# Patient Record
Sex: MALE | Race: WHITE | Employment: FULL TIME | URBAN - METROPOLITAN AREA
[De-identification: names, ages, dates, MRNs, and addresses within clinical notes are randomized per-mention and may not be internally consistent; named-entity substitution may affect disease eponyms.]

---

## 2021-08-02 ENCOUNTER — HOSPITAL ENCOUNTER (OUTPATIENT)
Dept: PHYSICAL THERAPY | Age: 51
Setting detail: THERAPIES SERIES
Discharge: HOME | End: 2021-08-02
Payer: COMMERCIAL

## 2021-08-02 PROCEDURE — 97162 PT EVAL MOD COMPLEX 30 MIN: CPT

## 2021-08-02 ASSESSMENT — PAIN DESCRIPTION - ORIENTATION: ORIENTATION: LEFT

## 2021-08-02 ASSESSMENT — PAIN DESCRIPTION - PAIN TYPE: TYPE: CHRONIC PAIN

## 2021-08-02 ASSESSMENT — PAIN DESCRIPTION - FREQUENCY: FREQUENCY: CONTINUOUS

## 2021-08-02 ASSESSMENT — PAIN DESCRIPTION - LOCATION: LOCATION: BACK;NECK

## 2021-08-02 ASSESSMENT — PAIN SCALES - GENERAL: PAINLEVEL_OUTOF10: 3

## 2021-08-02 NOTE — PLAN OF CARE
[address] [facility]   Radha Bain. [address], [place]  Phone:  [phone]   Fax:  [phone]    [] Certification  [] Recertification [x]  Plan of Care  [] Progress Note   [] Discharge        To:  Jt Yancey  From:  Beata Marti, MANDI  Patient: Arlyn Frias     : 1970  Diagnosis: Cervical radiculopathy     Date: 2021  Treatment Diagnosis: decreased cervical AROM, decreased UE strength, increases pain in neck and mid back and left side       Progress Report Period from: 21   to 2021                   OBJECTIVE:   Short Term Goals =Long term goals    Long Term Goals - Time Frame for Long term goals : 4 weeks  Goals Current/ Discharge status Met   Long term goal 1: Patient will report 50% improvement in left sided neck and upper back spasms. Patient reports spasms every night. [] yes  [x] no   Long term goal 2: Patient will increase cervical AROM with flexion, extension, and sidebending >/= 10 degrees for improved functional tolerance. AROM LUE (degrees)  LUE General AROM: shoulder WFLs  Spine  Cervical: flexion 40 deg, ext 25 deg, right SB 22 deg, left SB 30 deg, bilateral rotation 60 deg  Thoracic: thoracic rotation limited (reports tightness)  AROM RUE (degrees)  RUE General AROM: shoulder WFLs     [] yes  [x] no   Long term goal 3: Patient will increase strength in UEs and periscapulars >/= 1/2 manual muscle grade for improve lifting tolerance and work tolerance.        Strength RUE  R Shoulder Flexion: 4+/5  R Shoulder Extension: 4+/5  R Shoulder ABduction: 4+/5  R Elbow Flexion: 5/5  R Elbow Extension: 5/5  R Wrist Flexion: 4+/5  R Wrist Extension: 4+/5  Strength LUE  L Shoulder Flexion: 4+/5  L Shoulder Extension: 4+/5  L Shoulder ABduction: 4+/5  L Elbow Flexion: 5/5  L Elbow Extension: 5/5  L Wrist Flexion: 4+/5  L Wrist Extension: 4+/5  Strength Other  Other: decreased deep neck flexor endurance   [] yes  [x] no   Long term goal 4: NDI </= 16/50 to demonstrate functional improvements. NDI: 22/50 [] yes  [x] no   Long term goal 5: Patient will be independent with HEP. Patient issued HEP [] yes  [x] no     Body structures, Functions, Activity limitations: Decreased ROM, Decreased strength, Increased pain, Decreased posture, Decreased endurance  Assessment: Patient reports increased pain in neck/mid back with spasms as the day progresses. Upon PT evaluation, patient does demonstrate a cyst on left side of neck and patient reports he has multiple cysts including in the forearm and hip area. Educated patient to follow up with physician about cyst.  Patient would benefit from PT to improve ROM in neck and increase mid back strength to improve functional tolerance. Prognosis: Good  Discharge Recommendations: Continue to assess pending progress  New Education Provided: PT Education: Goals, PT Role, Plan of Care, Home Exercise Program    PLAN: [x] Evaluate and Treat  Frequency/Duration:  Plan  Times per week: 2  Plan weeks: 4  Current Treatment Recommendations: Strengthening, ROM, Endurance Training, Neuromuscular Re-education, Manual Therapy - Soft Tissue Mobilization, Safety Education & Training, Equipment Evaluation, Education, & procurement, Modalities     Precautions:             Patient Status:[x] Continue/ Initate plan of Care    [] Discharge PT. Recommend pt continue with HEP. [] Additional visits requested, Please re-certify for additional visits:        Signature: Electronically signed by Yady Lamas PT on 8/2/21 at 12:33 PM EDT      If you have any questions or concerns, please don't hesitate to call. Thank you for your referral.    I have reviewed this plan of care and certify a need for medically necessary rehabilitation services.     Physician Signature:__________________________________________________________  Date:  Please sign and return

## 2021-08-02 NOTE — PROGRESS NOTES
[facility]   Outpatient Physical Therapy   Evaluation      [] [address]  [x] [address] [facility]     Date: 2021  Patient: Johnathan Medina  : 1970  ACCT #: [de-identified]  Referring physician: Referring Practitioner: Yodit Cade    Referring Practitioner: Yodit Cade         Diagnosis: Cervical radiculopathy    Treatment Diagnosis: decreased cervical AROM, decreased UE strength, increases pain in neck and mid back and left side  PT Insurance Information: [employer]                 History   has no past medical history on file. has no past surgical history on file. Not on File  No current outpatient medications on file prior to encounter. No current facility-administered medications on file prior to encounter. Subjective  Subjective: Patient reports left sided neck and mid back pain about 4 months with insidious onset. Reports jose luis like left shoulder gives out when he is doing bench press. Increased pain as the day progresses. Muscle relaxer has helped at night. Reports spasms on the left side. Reports numbness and tingling in hands and feet. Denies any sudden loss of bowel or bladder control.   Additional Pertinent Hx: OA, chronic pain, HTN, hx pneumonia  Pain Screening  Patient Currently in Pain: Yes  Pain Assessment  Pain Assessment: 0-10  Pain Level: 3  Pain Type: Chronic pain  Pain Location: Back, Neck  Pain Orientation: Left  Pain Descriptors: Tightness, Constant, Sharp  Pain Frequency: Continuous    Social/Functional History  Lives With: Spouse  Type of Home: House  ADL Assistance: Independent  Ambulation Assistance: Independent  Transfer Assistance: Independent  Occupation: Full time employment  Type of occupation: [employer]         Objective  Vision  Vision: Impaired (losing reading vision)  Hearing  Hearing: Within functional limits  Observation/Palpation  Posture: Fair (rounded shoulders)  Palpation: increased tightness in suboccipitals, cervical paraspinals, and bilateral upper traps    Strength RUE  R Shoulder Flexion: 4+/5  R Shoulder Extension: 4+/5  R Shoulder ABduction: 4+/5  R Elbow Flexion: 5/5  R Elbow Extension: 5/5  R Wrist Flexion: 4+/5  R Wrist Extension: 4+/5  Strength LUE  L Shoulder Flexion: 4+/5  L Shoulder Extension: 4+/5  L Shoulder ABduction: 4+/5  L Elbow Flexion: 5/5  L Elbow Extension: 5/5  L Wrist Flexion: 4+/5  L Wrist Extension: 4+/5  Strength Other  Other: decreased deep neck flexor endurance                 AROM RUE (degrees)  RUE General AROM: shoulder WFLs     AROM LUE (degrees)  LUE General AROM: shoulder WFLs  Spine  Cervical: flexion 40 deg, ext 25 deg, right SB 22 deg, left SB 30 deg, bilateral rotation 60 deg  Thoracic: thoracic rotation limited (reports tightness)             Additional Measures  Other: NDI: 22/50  Sensation  Overall Sensation Status: Impaired  Additional Comments: reports numbness and tingling in hands and feet  Additional Comments: reports numbness and tingling in hands and feet   Bed mobility  Supine to Sit: Independent  Sit to Supine: Independent     Transfers  Sit to Stand: Independent  Stand to sit:  Independent  Ambulation 1  Surface: level tile  Device: No Device  Assistance: Independent  Gait Deviations: None  Distance: clinical distance in department                      Exercises:   Exercises  Exercise 1: suboccipital release with towel roll x 1 minute  Exercise 2: cervical AROM for home exercise program flexion, SB, ext  Exercise 3: upper trap stretch, levator scap stretch*  Exercise 4: UE ergometer*  Exercise 5: pec stretch*  Exercise 6: Tband rows/lats*  Exercise 7: chest pulls*  Exercise 8: prone scap*  Exercise 9: barell stretch*  Exercise 10: chin tucks*  Exercise 20: HEP: suboccipital release, cervical AROM    Manual:  Manual therapy  Soft Tissue Mobalization: STM  suboccipital, thoracic paraspinals (avoid cyst in cervical paraspinals on  left side-pt to follow up with physician)*    Modalities:  Modalities  Moist heat: left upper trap*      ** indicates treatment to be performed at future treatment     POST-PAIN    Pain Rating (0-10 pain scale): 3/10  Location and Pain Description same as pre-pain unless otherwise indicated. Action: [] NA  [x] Call Physician  [x] Perform HEP  [x] Meds as prescribed     Assessment   Conditions Requiring Skilled Therapeutic Intervention  Body structures, Functions, Activity limitations: Decreased ROM, Decreased strength, Increased pain, Decreased posture, Decreased endurance  Assessment: Patient reports increased pain in neck/mid back with spasms as the day progresses. Upon PT evaluation, patient does demonstrate a cyst on left side of neck and patient reports he has multiple cysts including in the forearm and hip area. Educated patient to follow up with physician about cyst.  Patient would benefit from PT to improve ROM in neck and increase mid back strength to improve functional tolerance. Treatment Diagnosis: decreased cervical AROM, decreased UE strength, increases pain in neck and mid back and left side  Prognosis: Good  Decision Making: Medium Complexity  History: OA, HTN, removal of cyst behind ear, hernia repair  Exam: decreased UE strength, decreased cervical ROM, cervical pain  Clinical Presentation: evolving  REQUIRES PT FOLLOW UP: Yes  Discharge Recommendations: Continue to assess pending progress    Patient Education   PT Education: Goals, PT Role, Plan of Care, Home Exercise Program    Pt verbalized/demonstrated good understanding:     [x] Yes         [] No, pt required further clarification. Goals   Patient goal: Patient goals : \"pain relief\"         Long term goals  Time Frame for Long term goals : 4 weeks  Long term goal 1: Patient will report 50% improvement in left sided neck and upper back spasms.   Long term goal 2: Patient will increase cervical AROM with flexion, extension, and sidebending >/= 10 degrees for improved functional tolerance. Long term goal 3: Patient will increase strength in UEs and periscapulars >/= 1/2 manual muscle grade for improve lifting tolerance and work tolerance. Long term goal 4: NDI </= 16/50 to demonstrate functional improvements. Long term goal 5: Patient will be independent with HEP. Plan:  Plan  Times per week: 2  Plan weeks: 4  Current Treatment Recommendations: Strengthening, ROM, Endurance Training, Neuromuscular Re-education, Manual Therapy - Soft Tissue Mobilization, Safety Education & Training, Equipment Evaluation, Education, & procurement, Modalities       Evaluation and patient rights have been reviewed and patient agrees with plan of care. Yes  [x]  No  []   Explain:     Signature: Electronically signed by Mateo Pichardo PT on 8/2/2021 at 12:25 PM      PT Individual Minutes  Time In: 7224  Time Out: 0945  Minutes: 42  Timed Code Treatment Minutes: 0 Minutes  Procedure Minutes: 42 PT evaluation minutes    Jerome Fall Risk Assessment  Risk Factor Scale  Score   History of Falls [] Yes  [x] No 25  0 0   Secondary Diagnosis [] Yes  [x] No 15  0 0   Ambulatory Aid [] Furniture  [] Crutches/cane/walker  [x] None/bedrest/wheelchair/nurse 30  15  0 0   IV/Heparin Lock [] Yes  [x] No 20  0 0   Gait/Transferring [] Impaired  [] Weak  [x] Normal/bedrest/immobile 20  10  0 0   Mental Status [] Forgets limitations  [x] Oriented to own ability 15  0 0      Total:0     Based on the Assessment score: check the appropriate box.   [x]  No intervention needed   Low =   Score of 0-24  []  Use standard prevention interventions Moderate =  Score of 24-44   [] Discuss fall prevention strategies   [] Indicate moderate falls risk on eval  []  Use high risk prevention interventions High = Score of 45 and higher   [] Discuss fall prevention strategies   [] Provide supervision during treatment time

## 2021-08-16 ENCOUNTER — HOSPITAL ENCOUNTER (OUTPATIENT)
Dept: PHYSICAL THERAPY | Age: 51
Setting detail: THERAPIES SERIES
Discharge: HOME | End: 2021-08-16
Payer: COMMERCIAL

## 2021-08-16 PROCEDURE — 97110 THERAPEUTIC EXERCISES: CPT

## 2021-08-16 ASSESSMENT — PAIN SCALES - GENERAL: PAINLEVEL_OUTOF10: 0

## 2021-08-16 NOTE — PROGRESS NOTES
[facility]   Outpatient Physical Therapy   Treatment Note  [] [address]  [x] [address] [facility]            of [address] [place] [address] Drive  Date: 2021  Patient: Benji Olson  : 1970  ACCT #: [de-identified]  Referring Practitioner: Maria Fernanda Madison  Diagnosis: Cervical radiculopathy  Treatment Diagnosis: decreased cervical AROM, decreased UE strength, increases pain in neck and mid back and left side     Visit Information:  PT Visit Information  PT Insurance Information: [employer]  Total # of Visits Approved: 8  Total # of Visits to Date: 2  Plan of Care/Certification Expiration Date: 10/01/21  No Show: 0  Canceled Appointment: 0  Progress Note Counter:  ( 3/32 units)    SUBJECTIVE:   Subjective  Subjective: im not feeling too bad. no pain right now but it usually doesnt happen until like 5pm.  HEP Compliance:  [] Good [] Fair [x] Poor [x] Reports not doing due to: Lost HEP.  Will give another copy this date    PAIN   Location:      Pain Rating (0-10 pain scale):  Pain Level: 0  Pain Description:     Action:  [x] Acceptable for treatment  []  Other:    OBJECTIVE:   Exercises  Exercise 1: suboccipital release with towel roll x 1 minute (verbally reviewed)  Exercise 2: cervical AROM for home exercise program flexion, SB, ext, rot x10  Exercise 3: upper trap stretch [id number] , levator scap stretch [id number]  Exercise 4: UE ergometer x10 mins 5 fwd/5 bwd  L 2  Exercise 5: doorway pec stretch [id number]  Exercise 6: RTB  rows/lats/ scap retract  x15 (good posture noted by pt)  Exercise 7: chest pulls RTB x15  Exercise 8: prone scap I/T/Y x5, x5 #2 weight  Exercise 9: barrel stretch x5 hold 10 sec  Exercise 10: chin tucks x10  Exercise 11: seated posture interventions: shoulder rolls x10 fwd/bwd , shrugs x10, scap retractions (x10 hold 5 sec)  Exercise 20: HEP: suboccipital release, cervical AROM, UT stretch      HEP  Continue with current Home Exercise Program.  See Objective section for PT for ___ days.   See note to physician  [] Discharge PT    Signature:   Electronically signed by Dayanna Longoria PTA on 8/16/21 at 11:05 AM EDT    PT Individual Minutes  Time In: 9272  Time Out: 5523  Minutes: 49  Timed Code Treatment Minutes: 49 Minutes    Activity Minutes Units   Ther Ex 49 3

## 2021-08-23 ENCOUNTER — HOSPITAL ENCOUNTER (OUTPATIENT)
Dept: PHYSICAL THERAPY | Age: 51
Setting detail: THERAPIES SERIES
Discharge: HOME | End: 2021-08-23
Payer: COMMERCIAL

## 2021-08-23 NOTE — PROGRESS NOTES
[address] of [address]      Physical Therapy  Cancellation/No-show Note          Patient Name:  Ashley Hemphill  :  1970   Date:  2021  Referring Practitioner: Jay Torres  Diagnosis: Cervical radiculopathy    Visit Information:  PT Visit Information  PT Insurance Information: [employer]  Total # of Visits Approved: 8  Total # of Visits to Date: 2  Plan of Care/Certification Expiration Date: 10/01/21  No Show: 0  Canceled Appointment: 1  Progress Note Counter:  (cx 21)( 3/32 units)    For today's appointment patient:  [x]  Cancelled  []  Rescheduled appointment  []  No-show   []  Called pt to remind of next appointment     Reason given by patient:  []  Patient ill  [x]  Conflicting appointment  []  No transportation    []  Conflict with work  []  Weather  []  No reason given   []  Other:       Comments:       Signature: Electronically signed by Claudia Snyder PTA on 21 at 7:19 AM EDT

## 2021-08-30 ENCOUNTER — HOSPITAL ENCOUNTER (OUTPATIENT)
Dept: PHYSICAL THERAPY | Age: 51
Setting detail: THERAPIES SERIES
Discharge: HOME | End: 2021-08-30
Payer: COMMERCIAL

## 2021-08-30 PROCEDURE — 97140 MANUAL THERAPY 1/> REGIONS: CPT

## 2021-08-30 PROCEDURE — 97110 THERAPEUTIC EXERCISES: CPT

## 2021-08-30 NOTE — PROGRESS NOTES
[address] [address]  Outpatient Physical Therapy    Treatment Note        Date: 2021  Patient: Landy Rdz  : 1970  ACCT #: [de-identified]  Referring Practitioner: Quinn Sow  Diagnosis: Cervical radiculopathy  Treatment Diagnosis: decreased cervical AROM, decreased UE strength, increases pain in neck and mid back and left side    Visit Information:  PT Visit Information  PT Insurance Information: [employer]  Total # of Visits Approved: 8  Total # of Visits to Date: 3  Plan of Care/Certification Expiration Date: 10/01/21  No Show: 0  Canceled Appointment: 1  Progress Note Counter:  ( 32 units approved  units used )    Subjective: Patient c/o increased pain with Right UT stretch while doing HEP. Note pain shoots from neck to low back and subsides immediately after stretch. HEP Compliance:  [x] Good [] Fair [] Poor [] Reports not doing due to:    Vital Signs  Patient Currently in Pain: Denies   Pain Screening  Patient Currently in Pain: Denies    OBJECTIVE:   Exercises  Exercise 3: upper trap stretch [id number] , levator scap stretch [id number]  Exercise 4: UE ergometer x10 mins 5 fwd/5 bwd  L 2  Exercise 5: doorway pec stretch [id number]  Exercise 6: RTB  rows/lats/ scap retract  x15  Exercise 7: chest pulls RTB x15  Exercise 8: prone scap I/T/Y NV  Exercise 9: barrel stretch x5 hold 10 sec  Exercise 10: chin tucks 2x10  Exercise 11: seated posture interventions: shoulder rolls x15 fwd/bwd , shrugs x15, scap retractions (x15 hold 5 sec)  Exercise 20: HEP: suboccipital release, cervical AROM, UT stretch    Manual:   Manual therapy  Soft Tissue Mobalization: STM  suboccipital, R cervical paraspinals, swapnil scap (avoid cyst in cervical paraspinals on  left side-pt to follow up with physician)    *Indicates exercise, modality, or manual techniques to be initiated when appropriate    Assessment:    Body structures, Functions, Activity limitations: Decreased ROM, Decreased strength, Increased pain, Decreased posture, Decreased endurance  Assessment: continued current POC for cervical mobility and periscap strength to improve posture and decrease pain. Patient requires intermittent cuing to correct forward head and maintain duration of stretch. Instructed patient to modify range and intensity of R UT stretch. Patient denies episode of shooting pain. Treatment Diagnosis: decreased cervical AROM, decreased UE strength, increases pain in neck and mid back and left side  Prognosis: Good  Goals:  Long term goals  Time Frame for Long term goals : 4 weeks  Long term goal 1: Patient will report 50% improvement in left sided neck and upper back spasms. Long term goal 2: Patient will increase cervical AROM with flexion, extension, and sidebending >/= 10 degrees for improved functional tolerance. Long term goal 3: Patient will increase strength in UEs and periscapulars >/= 1/2 manual muscle grade for improve lifting tolerance and work tolerance. Long term goal 4: NDI </= 16/50 to demonstrate functional improvements. Long term goal 5: Patient will be independent with HEP. Progress toward goals: continue towards all     POST-PAIN       Pain Rating (0-10 pain scale):   0/10   Location and pain description same as pre-treatment unless indicated. Action: [] NA   [] Perform HEP  [] Meds as prescribed  [] Modalities as prescribed   [] Call Physician     Frequency/Duration:  Plan  Times per week: 2  Plan weeks: 4  Current Treatment Recommendations: Strengthening, ROM, Endurance Training, Neuromuscular Re-education, Manual Therapy - Soft Tissue Mobilization, Safety Education & Training, Equipment Evaluation, Education, & procurement, Modalities     Pt to continue current HEP. See objective section for any therapeutic exercise changes, additions or modifications this date.     PT Individual Minutes  Time In: 1000  Time Out: 5550  Minutes: 43  Timed Code Treatment Minutes: 43 Minutes  Procedure Minutes:0     Timed Activity Minutes Units   Ther Ex 33 2   Manual 10 1       Signature:  Electronically signed by Juana Box PTA on 8/30/21 at 12:47 PM EDT

## 2021-09-13 ENCOUNTER — HOSPITAL ENCOUNTER (OUTPATIENT)
Dept: PHYSICAL THERAPY | Age: 51
Setting detail: THERAPIES SERIES
Discharge: HOME | End: 2021-09-13
Payer: COMMERCIAL

## 2021-09-13 PROCEDURE — 97140 MANUAL THERAPY 1/> REGIONS: CPT

## 2021-09-13 PROCEDURE — 97110 THERAPEUTIC EXERCISES: CPT

## 2021-09-13 ASSESSMENT — PAIN SCALES - GENERAL: PAINLEVEL_OUTOF10: 2

## 2021-09-13 ASSESSMENT — PAIN DESCRIPTION - LOCATION: LOCATION: NECK

## 2021-09-13 ASSESSMENT — PAIN DESCRIPTION - PAIN TYPE: TYPE: CHRONIC PAIN

## 2021-09-13 NOTE — PROGRESS NOTES
[facility]   Outpatient Physical Therapy   Treatment Note  [] [address]  [x] [facility]            of [address]  Date: 2021  Patient: Tootie Terry  : 1970  ACCT #: [de-identified]  Referring Practitioner: Fatuma Leiva  Diagnosis: Cervical radiculopathy  Treatment Diagnosis: decreased cervical AROM, decreased UE strength, increases pain in neck and mid back and left side     Visit Information:  PT Visit Information  PT Insurance Information: [employer]  Total # of Visits Approved: 8  Total # of Visits to Date: 4  Plan of Care/Certification Expiration Date: 10/01/21  No Show: 0  Canceled Appointment: 1  Progress Note Counter: 3/8 ( 32 units approved  units used )    SUBJECTIVE:   Subjective  Subjective: Patient reports pain 6/10 at 4pm usually. Reports difficult with ROM at the end of the day.   HEP Compliance:  [x] Good [] Fair [] Poor [] Reports not doing due to:    PAIN   Location:   Pain Location: Neck  Pain Rating (0-10 pain scale):  Pain Level: 2  Pain Description:     Action:  [x] Acceptable for treatment  []  Other:    OBJECTIVE:   Exercises  Exercise 3: upper trap stretch [id number] , levator scap stretch 3 x 30 seconds  Exercise 4: UE ergometer x 6 minutes (3 minutes forward, 3 minutes retro) L2  Exercise 5: doorway pec stretch [id number]  Exercise 6: RTB  rows/lats x 20  Exercise 7: chest pulls RTB x15  Exercise 8: prone scap IR/ER/Rows with 5# weight x 10  Exercise 9: barrel stretch 15 second hold x 5  Exercise 10: chin tucks x 20  Exercise 20: HEP: Tband rows/lats/chest pulls    Manual: []  NA   Manual therapy  Soft Tissue Mobalization: STM  suboccipital, R cervical paraspinals, swapnil scap (avoid cyst in cervical paraspinals on  left side-pt to follow up with physician), bilateral upper trap  Other: total time: 10 minutes    Modalities: [x]  NA        Mobility: [x]  NA                         Strength: [x] NT                   ROM: [] NT Spine  Cervical: flexion 50 deg, ext 35 deg, right SB 25 deg, left SB 35 deg    HEP  Continue with current Home Exercise Program.  See Objective section for progression of HEP. Comments:       POST-PAIN    Pain Rating (0-10 pain scale): 0/10  Location and Pain Description same as pre-pain unless otherwise indicated. Action: [] NA  [] Call Physician  [] Perform HEP  [] Meds as prescribed     ASSESSMENT:     Conditions Requiring Skilled Therapeutic Intervention  Body structures, Functions, Activity limitations: Decreased ROM, Decreased strength, Increased pain, Decreased posture, Decreased endurance  Assessment: Patient demonstrates improved cervical ROM. Good tolerance to exercises without increase in pain. Added more strengthening to home program.  Patient reports decreased pain post treatment. Treatment Diagnosis: decreased cervical AROM, decreased UE strength, increases pain in neck and mid back and left side  Prognosis: Good  Decision Making: Medium Complexity  REQUIRES PT FOLLOW UP: Yes  Discharge Recommendations: Continue to assess pending progress        Tolerance to treatment:  [x] Good   [] Fair   [] Poor  [] Fatigued   [] Increased pain   Limited by:    Goals:  Patient goals : \"pain relief\"     Long term goals  Time Frame for Long term goals : 4 weeks  Long term goal 1: Patient will report 50% improvement in left sided neck and upper back spasms. Long term goal 2: Patient will increase cervical AROM with flexion, extension, and sidebending >/= 10 degrees for improved functional tolerance. Long term goal 3: Patient will increase strength in UEs and periscapulars >/= 1/2 manual muscle grade for improve lifting tolerance and work tolerance. Long term goal 4: NDI </= 16/50 to demonstrate functional improvements. Long term goal 5: Patient will be independent with HEP.     Progress toward goals:ROM, strength  Goals Met:    []  See updated POC   Comments:    PLAN:  [x] Continue POC to pt tolerance  [] Hold PT for ___ days.   See note to physician  [] Discharge PT    Signature:   Electronically signed by Ritesh Bagley PT on 9/13/21 at 10:37 AM EDT    PT Individual Minutes  Time In: 3526  Time Out: 1050  Minutes: 45  Timed Code Treatment Minutes: 45 Minutes    Activity Minutes Units   Ther Ex 35 2   Manual   10  1   Neuro Ed     Estim

## 2021-09-20 ENCOUNTER — HOSPITAL ENCOUNTER (OUTPATIENT)
Dept: PHYSICAL THERAPY | Age: 51
Setting detail: THERAPIES SERIES
Discharge: HOME | End: 2021-09-20
Payer: COMMERCIAL

## 2021-09-20 PROCEDURE — 97110 THERAPEUTIC EXERCISES: CPT

## 2021-09-20 PROCEDURE — 97140 MANUAL THERAPY 1/> REGIONS: CPT

## 2021-09-20 ASSESSMENT — PAIN SCALES - GENERAL: PAINLEVEL_OUTOF10: 0

## 2021-09-20 NOTE — PROGRESS NOTES
indicated. Action: [] NA  [] Call Physician  [x] Perform HEP  [] Meds as prescribed     ASSESSMENT:     Conditions Requiring Skilled Therapeutic Intervention  Body structures, Functions, Activity limitations: Decreased ROM, Decreased strength, Increased pain, Decreased posture, Decreased endurance  Assessment: pt with good ana maria to increased resistance and new exercises. NO noted increased pain throughout therapy. Treatment Diagnosis: decreased cervical AROM, decreased UE strength, increases pain in neck and mid back and left side        Tolerance to treatment:  [x] Good   [] Fair   [] Poor  [] Fatigued   [] Increased pain   Limited by:    Goals:        Long term goals  Time Frame for Long term goals : 4 weeks  Long term goal 1: Patient will report 50% improvement in left sided neck and upper back spasms. Long term goal 2: Patient will increase cervical AROM with flexion, extension, and sidebending >/= 10 degrees for improved functional tolerance. Long term goal 3: Patient will increase strength in UEs and periscapulars >/= 1/2 manual muscle grade for improve lifting tolerance and work tolerance. Long term goal 4: NDI </= 16/50 to demonstrate functional improvements. Long term goal 5: Patient will be independent with HEP. Progress toward goals: rom  Goals Met:    []  See updated POC   Comments:    PLAN:  [x] Continue POC to pt tolerance  [] Hold PT for ___ days.   See note to physician  [] Discharge PT    Signature:   Electronically signed by Alberto Vidal PTA on 9/20/21 at 3:13 PM EDT    PT Individual Minutes  Time In: 1300  Time Out: 5142  Minutes: 55  Timed Code Treatment Minutes: 55 Minutes    Activity Minutes Units   Ther Ex 38 3   Manual   17  1

## 2021-09-27 ENCOUNTER — HOSPITAL ENCOUNTER (OUTPATIENT)
Dept: PHYSICAL THERAPY | Age: 51
Setting detail: THERAPIES SERIES
Discharge: HOME | End: 2021-09-27
Payer: COMMERCIAL

## 2021-09-27 PROCEDURE — 97140 MANUAL THERAPY 1/> REGIONS: CPT

## 2021-09-27 PROCEDURE — 97110 THERAPEUTIC EXERCISES: CPT

## 2021-09-27 ASSESSMENT — PAIN SCALES - GENERAL: PAINLEVEL_OUTOF10: 3

## 2021-09-27 ASSESSMENT — PAIN DESCRIPTION - DESCRIPTORS: DESCRIPTORS: TIGHTNESS

## 2021-09-27 ASSESSMENT — PAIN DESCRIPTION - LOCATION: LOCATION: NECK

## 2021-09-27 ASSESSMENT — PAIN DESCRIPTION - ORIENTATION: ORIENTATION: LEFT

## 2021-09-27 NOTE — PROGRESS NOTES
[facility]   Outpatient Physical Therapy   Treatment Note  [] [address]  [x] [facility]  Date: 2021  Patient: Shiv Fernandez  : 1970  ACCT #: [de-identified]  Referring Practitioner: Tyrone Matthews  Diagnosis: Cervical radiculopathy  Treatment Diagnosis: decreased cervical AROM, decreased UE strength, increases pain in neck and mid back and left side     Visit Information:  PT Visit Information  [facility]  Total # of Visits Approved: 8  Total # of Visits to Date: 6  Plan of Care/Certification Expiration Date: 10/01/21  No Show: 0  Canceled Appointment: 1  Progress Note Counter:  ( 32 units approved  units used )    SUBJECTIVE:   Subjective  Subjective: pt reprorts that pain and spasm started last night. Pt felt pretty good after last visit. HEP Compliance:  [x] Good [] Fair [] Poor [] Reports not doing due to:    PAIN   Location:   Pain Location: Neck  Pain Rating (0-10 pain scale):  Pain Level: 3  Pain Description:  Pain Descriptors: Tightness  Action:  [x] Acceptable for treatment  []  Other:    OBJECTIVE:   Exercises  Exercise 3: upper trap stretch [id number] , levator scap stretch 3 x 30 seconds  Exercise 4: UE ergometer x 8 minutes (4 minutes forward, 4 minutes retro) L2  Exercise 5: doorway pec stretch [id number]  Exercise 6: gTB  rows/lats x 20  Exercise 7: chest pulls 3 way GTB x10  Exercise 8: prone scap 4 way 5# wt   Exercise 9: barrel stretch 15 second hold x 5  Exercise 12:  wall push ups x10     Manual: []  NA   Manual therapy  Soft Tissue Mobalization: STM  suboccipital, R cervical paraspinals, swapnil scap (avoid cyst in cervical paraspinals on  left side-pt to follow up with physician), bilateral upper trap      HEP  Continue with current Home Exercise Program.  See Objective section for progression of HEP.       Comments:       POST-PAIN    Pain Rating (0-10 pain scale): 0/10  Location and Pain Description same as pre-pain unless otherwise indicated. Action: [] NA  [] Call Physician  [x] Perform HEP  [] Meds as prescribed     ASSESSMENT:     Conditions Requiring Skilled Therapeutic Intervention  Body structures, Functions, Activity limitations: Decreased ROM, Decreased strength, Increased pain, Decreased posture, Decreased endurance  Assessment: Pt with good ana maria to increased resistance with band activities. Pt  with cont spasm in sub occ region that may be causing pain in neck. Treatment Diagnosis: decreased cervical AROM, decreased UE strength, increases pain in neck and mid back and left side        Tolerance to treatment:  [x] Good   [] Fair   [] Poor  [] Fatigued   [] Increased pain   Limited by:    Goals:        Long term goals  Time Frame for Long term goals : 4 weeks  Long term goal 1: Patient will report 50% improvement in left sided neck and upper back spasms. Long term goal 2: Patient will increase cervical AROM with flexion, extension, and sidebending >/= 10 degrees for improved functional tolerance. Long term goal 3: Patient will increase strength in UEs and periscapulars >/= 1/2 manual muscle grade for improve lifting tolerance and work tolerance. Long term goal 4: NDI </= 16/50 to demonstrate functional improvements. Long term goal 5: Patient will be independent with HEP. Progress toward goals: rom  Goals Met:    []  See updated POC   Comments:    PLAN:  [x] Continue POC to pt tolerance  [] Hold PT for ___ days.   See note to physician  [] Discharge PT    Signature:   Electronically signed by Joan Overton PTA on 9/27/21 at 2:17 PM EDT    PT Individual Minutes  Time In: 4938  Time Out: 8955  Minutes: 56  Timed Code Treatment Minutes: 56 Minutes    Activity Minutes Units   Ther Ex 41 3   Manual   15  1

## 2021-10-04 ENCOUNTER — HOSPITAL ENCOUNTER (OUTPATIENT)
Dept: PHYSICAL THERAPY | Age: 51
Setting detail: THERAPIES SERIES
Discharge: HOME | End: 2021-10-04
Payer: COMMERCIAL

## 2021-10-04 NOTE — PROGRESS NOTES
[address] of [address] [facility]      Physical Therapy  Cancellation/No-show Note          Patient Name:  Yolanda Freeman  :  1970   Date:  10/4/2021  Referring Practitioner: Mattie Camejo  Diagnosis: Cervical radiculopathy    Visit Information:  PT Visit Information  PT Insurance Information: [employer]  Total # of Visits Approved: 8  Plan of Care/Certification Expiration Date: 21  Progress Note Counter:  nc/ns ( 32 units approved  units used )    For today's appointment patient:  []  Cancelled  []  Rescheduled appointment  [x]  No-show   []  Called pt to remind of next appointment     Reason given by patient:  []  Patient ill  []  Conflicting appointment  []  No transportation    []  Conflict with work  []  Weather  []  No reason given   []  Other:       Comments:       Signature: Electronically signed by Onofre Cohen PTA on 10/4/21 at 12:52 PM EDT

## 2021-10-11 ENCOUNTER — APPOINTMENT (OUTPATIENT)
Dept: PHYSICAL THERAPY | Age: 51
End: 2021-10-11
Payer: COMMERCIAL

## 2021-10-18 ENCOUNTER — HOSPITAL ENCOUNTER (OUTPATIENT)
Dept: PHYSICAL THERAPY | Age: 51
Setting detail: THERAPIES SERIES
Discharge: HOME | End: 2021-10-18
Payer: COMMERCIAL

## 2021-10-18 PROCEDURE — 97110 THERAPEUTIC EXERCISES: CPT

## 2021-10-18 PROCEDURE — 97140 MANUAL THERAPY 1/> REGIONS: CPT

## 2021-10-18 ASSESSMENT — PAIN DESCRIPTION - LOCATION: LOCATION: NECK

## 2021-10-18 ASSESSMENT — PAIN DESCRIPTION - ORIENTATION: ORIENTATION: LEFT

## 2021-10-18 ASSESSMENT — PAIN SCALES - GENERAL: PAINLEVEL_OUTOF10: 1

## 2021-10-18 ASSESSMENT — PAIN DESCRIPTION - PAIN TYPE: TYPE: CHRONIC PAIN

## 2021-10-18 ASSESSMENT — PAIN DESCRIPTION - DESCRIPTORS: DESCRIPTORS: TIGHTNESS

## 2021-10-18 NOTE — PROGRESS NOTES
[facility]   Outpatient Physical Therapy   Treatment Note  [] [address]  [x] [facility]            of [address]  Date: 10/18/2021  Patient: Lara Jimenez  : 1970  ACCT #: [de-identified]  Referring Practitioner: Kari Banuelos  Diagnosis: Cervical radiculopathy  Treatment Diagnosis: decreased cervical AROM, decreased UE strength, increases pain in neck and mid back and left side     Visit Information:  PT Visit Information  PT Insurance Information: [employer]  Total # of Visits Approved: 8  Total # of Visits to Date: 7  Plan of Care/Certification Expiration Date: 21  No Show: 0  Canceled Appointment: 1  Progress Note Counter:  ( 32 units approved  units used )    SUBJECTIVE:   Subjective  Subjective: pt reports that neck has only hurt 3 x since last viit   HEP Compliance:  [x] Good [] Fair [] Poor [] Reports not doing due to:    PAIN   Location:   Pain Location: Neck  Pain Rating (0-10 pain scale):  Pain Level: 1  Pain Description:  Pain Descriptors: Tightness  Action:  [x] Acceptable for treatment  []  Other:    OBJECTIVE:   Exercises  Exercise 3: upper trap stretch [id number] , levator scap stretch 3 x 30 seconds  Exercise 5: doorway pec stretch [id number]  Exercise 6: gTB  rows/lats x 20  Exercise 8: prone scap 4 way 5# wt   Exercise 9: barrel stretch 30 sec x3   Exercise 12:  wall push ups x15   Exercise 13: er x15 rtb     Manual: []  NA   Manual therapy  Soft Tissue Mobalization: STM  suboccipital, R cervical paraspinals, swapnil scap (avoid cyst in cervical paraspinals on  left side-pt to follow up with physician), bilateral upper trap      HEP  Continue with current Home Exercise Program.  See Objective section for progression of HEP. Comments:       POST-PAIN    Pain Rating (0-10 pain scale): 0/10  Location and Pain Description same as pre-pain unless otherwise indicated.   Action: [] NA  [] Call Physician  [x] Perform HEP  [] Meds as prescribed     ASSESSMENT:     Conditions Requiring Skilled Therapeutic Intervention  Body structures, Functions, Activity limitations: Decreased ROM, Decreased strength, Increased pain, Decreased posture, Decreased endurance  Assessment: Pt with increased ana maria to exercises. Held tband chest pulls due to some increased shoulder pain this date. Treatment Diagnosis: decreased cervical AROM, decreased UE strength, increases pain in neck and mid back and left side        Tolerance to treatment:  [x] Good   [] Fair   [] Poor  [] Fatigued   [] Increased pain   Limited by:    Goals:        Long term goals  Time Frame for Long term goals : 4 weeks  Long term goal 1: Patient will report 50% improvement in left sided neck and upper back spasms. Long term goal 2: Patient will increase cervical AROM with flexion, extension, and sidebending >/= 10 degrees for improved functional tolerance. Long term goal 3: Patient will increase strength in UEs and periscapulars >/= 1/2 manual muscle grade for improve lifting tolerance and work tolerance. Long term goal 4: NDI </= 16/50 to demonstrate functional improvements. Long term goal 5: Patient will be independent with HEP. Progress toward goals: rom  Goals Met:    []  See updated POC   Comments:    PLAN:  [x] Continue POC to pt tolerance  [] Hold PT for ___ days.   See note to physician  [] Discharge PT    Signature:   Electronically signed by Carlin Halsted, PTA on 10/18/21 at 2:19 PM EDT    PT Individual Minutes  Time In: 8943  Time Out: 6267  Minutes: 47  Timed Code Treatment Minutes: 47 Minutes    Activity Minutes Units   Ther Ex 25 2   Manual   22 1

## 2021-10-25 ENCOUNTER — HOSPITAL ENCOUNTER (OUTPATIENT)
Dept: PHYSICAL THERAPY | Age: 51
Setting detail: THERAPIES SERIES
Discharge: HOME | End: 2021-10-25
Payer: COMMERCIAL

## 2021-10-25 PROCEDURE — 97140 MANUAL THERAPY 1/> REGIONS: CPT

## 2021-10-25 PROCEDURE — 97110 THERAPEUTIC EXERCISES: CPT

## 2021-10-25 ASSESSMENT — PAIN SCALES - GENERAL: PAINLEVEL_OUTOF10: 3

## 2021-10-25 ASSESSMENT — PAIN DESCRIPTION - LOCATION: LOCATION: NECK;HEAD;SHOULDER

## 2021-10-25 ASSESSMENT — PAIN DESCRIPTION - ORIENTATION: ORIENTATION: LEFT

## 2021-10-25 NOTE — PROGRESS NOTES
[facility]   Outpatient Physical Therapy   Treatment Note  [] [address]  [] [facility]            of [address]  Date: 10/25/2021  Patient: Leroy Tariq  : 1970  ACCT #: [de-identified]  Referring Practitioner: Domenica Palacios  Diagnosis: Cervical radiculopathy        Visit Information:  PT Visit Information  PT Insurance Information: [employer]  Total # of Visits Approved: 8  Total # of Visits to Date: 8  Plan of Care/Certification Expiration Date: 21  No Show: 0  Canceled Appointment: 1  Progress Note Counter:  ( 32 units approved 23/32 units used )    SUBJECTIVE:   Subjective  Subjective: Pt reports pain over the last week has gone up to 9-10/10   HEP Compliance:  [x] Good [] Fair [] Poor [] Reports not doing due to:    PAIN   Location:   Pain Location: Neck, Head, Shoulder  Pain Rating (0-10 pain scale):  Pain Level: 3  Pain Description:  Pain Descriptors:  (just hurts )  Action:  [x] Acceptable for treatment  []  Other:    OBJECTIVE:   Exercises  Exercise 3: upper trap stretch [id number] , levator scap stretch 3 x 30 seconds  Exercise 9: barrel stretch 30 sec x3   Exercise 20: HEP cont current as ana maria     Manual: []  NA   Manual therapy  Soft Tissue Mobalization: STM  suboccipital, R cervical paraspinals, swapnil scap (avoid cyst in cervical paraspinals on  left side-pt to follow up with physician), bilateral upper trap  Other: cupping jessica ut avoided cyst on the lt neck. HEP  Continue with current Home Exercise Program.  See Objective section for progression of HEP. Comments:       POST-PAIN    Pain Rating (0-10 pain scale): 0/10  Location and Pain Description same as pre-pain unless otherwise indicated.   Action: [] NA  [] Call Physician  [x] Perform HEP  [] Meds as prescribed     ASSESSMENT:     Conditions Requiring Skilled Therapeutic Intervention  Body structures, Functions, Activity limitations: Decreased ROM, Decreased strength, Increased pain, Decreased posture, Decreased endurance  Assessment: Focused treatment on manual vs hteraputic exercises due to increased pain this date. Trialed cupping to see ana maria and to decrease spasm. Tolerance to treatment:  [x] Good   [] Fair   [] Poor  [] Fatigued   [] Increased pain   Limited by:    Goals:        Long term goals  Time Frame for Long term goals : 4 weeks  Long term goal 1: Patient will report 50% improvement in left sided neck and upper back spasms. Long term goal 2: Patient will increase cervical AROM with flexion, extension, and sidebending >/= 10 degrees for improved functional tolerance. Long term goal 3: Patient will increase strength in UEs and periscapulars >/= 1/2 manual muscle grade for improve lifting tolerance and work tolerance. Long term goal 4: NDI </= 16/50 to demonstrate functional improvements. Long term goal 5: Patient will be independent with HEP. Progress toward goals: rom  Goals Met:    []  See updated POC   Comments:    PLAN:  [x] Continue POC to pt tolerance  [] Hold PT for ___ days.   See note to physician  [] Discharge PT    Signature:   Electronically signed by Amish Marin PTA on 10/25/21 at 2:26 PM EDT    PT Individual Minutes  Time In: 2118  Time Out: 8181  Minutes: 42  Timed Code Treatment Minutes: 42 Minutes    Activity Minutes Units   Ther Ex 12 1   Manual   30  2

## 2021-11-01 ENCOUNTER — HOSPITAL ENCOUNTER (OUTPATIENT)
Dept: PHYSICAL THERAPY | Age: 51
Setting detail: THERAPIES SERIES
Discharge: HOME | End: 2021-11-01
Payer: COMMERCIAL

## 2021-11-01 PROCEDURE — 97110 THERAPEUTIC EXERCISES: CPT

## 2021-11-01 PROCEDURE — 97140 MANUAL THERAPY 1/> REGIONS: CPT

## 2021-11-01 ASSESSMENT — PAIN SCALES - GENERAL: PAINLEVEL_OUTOF10: 2

## 2021-11-01 ASSESSMENT — PAIN DESCRIPTION - ORIENTATION: ORIENTATION: LEFT

## 2021-11-01 ASSESSMENT — PAIN DESCRIPTION - FREQUENCY: FREQUENCY: CONTINUOUS

## 2021-11-01 ASSESSMENT — PAIN DESCRIPTION - LOCATION: LOCATION: NECK;HEAD

## 2021-11-01 NOTE — PROGRESS NOTES
[address] [facility]   Radha [address]ain. [address] [place], [place]  Phone:  [phone]   Fax:  [phone]    [] Certification  [] Recertification []  Plan of Care  [] Progress Note   [] Discharge        To:  Henrique Pandey  From:  Day Cruz, PT  Patient: Patt Sweet     : 1970  Diagnosis: Cervical radiculopathy     Date: 2021  Treatment Diagnosis: decreased cervical AROM, decreased UE strength, increases pain in neck and mid back and left side    Plan of Care/Certification Expiration Date: 21  Progress Report Period from: 21  to 2021    Total # of Visits to Date: 9   No Show: 0    Canceled Appointment: 1     OBJECTIVE:   Short Term Goals =Long term goals    Long Term Goals - Time Frame for Long term goals : 4 weeks  Goals Current/ Discharge status Met   Long term goal 1: Patient will report 75% improvement in left sided neck and upper back spasms. Updated goal 21 Pt reports 50% improvement in neck and back spasms [] yes  [x] no   Long term goal 2: Patient will demostate cervical AROM with flexion, extension, and sidebending >/= 45 degrees for improved functional tolerance. Updated goal 21 Spine  Cervical: flexion 60 deg, ext 40 deg, right SB 40 deg, left SB 40 deg   [] yes  [x] no   Long term goal 3: Patient will increase strength in UEs and periscapulars to 5/5 for improve lifting tolerance and work tolerance. Updated goal 21   Strength RUE  R Shoulder Flexion: 4+/5  R Shoulder Extension: 5/5  R Shoulder ABduction: 4+/5  R Elbow Flexion: 5/5  R Elbow Extension: 5/5  R Wrist Flexion: 4+/5  R Wrist Extension: 5/5  Strength LUE  L Shoulder Flexion: 4+/5  L Shoulder Extension: 5/5  L Shoulder ABduction: 4+/5  L Elbow Flexion: 5/5  L Elbow Extension: 5/5  L Wrist Flexion: 4+/5  L Wrist Extension: 5/5    [x] yes  [x] no   Long term goal 4: NDI </= 16/50 to demonstrate functional improvements.  NDI 22/50 [] yes  [x] no   Long term goal 5: Patient will be independent with HEP. Progressing  [] yes  [x] no     Body structures, Functions, Activity limitations: Decreased ROM, Decreased strength, Increased pain, Decreased posture, Decreased endurance  Assessment: Pt reports 50% improvement in neck and back spasms since coming to PT. Patient also has made progress with ROM and UE strength. Further PT to continue to work toward goals for overall quality of life. New Education Provided:      PLAN: [x] Evaluate and Treat  Frequency/Duration:  Plan  Times per week: 2  Plan weeks: 4  Current Treatment Recommendations: Strengthening, ROM, Endurance Training, Neuromuscular Re-education, Manual Therapy - Soft Tissue Mobilization, Safety Education & Training, Equipment Evaluation, Education, & procurement, Modalities     Precautions:             Patient Status:[x] Continue/ Initate plan of Care    [] Discharge PT. Recommend pt continue with HEP. [] Additional visits requested, Please re-certify for additional visits:        Signature: Electronically signed by Karla Francis PTA on 11/1/21 at 12:54 PM EDT  Electronically signed by Raquel Escamilla PT on 11/1/2021 at 4:55 PM        If you have any questions or concerns, please don't hesitate to call. Thank you for your referral.    I have reviewed this plan of care and certify a need for medically necessary rehabilitation services.     Physician Signature:__________________________________________________________  Date:  Please sign and return

## 2021-11-01 NOTE — PROGRESS NOTES
ROM: [] NT      Spine  Cervical: flexion 60 deg, ext 40 deg, right SB 40 deg, left SB 40 deg    HEP  Continue with current Home Exercise Program.  See Objective section for progression of HEP. Comments:       POST-PAIN    Pain Rating (0-10 pain scale): 0/10  Location and Pain Description same as pre-pain unless otherwise indicated. Action: [] NA  [] Call Physician  [x] Perform HEP  [] Meds as prescribed     ASSESSMENT:     Conditions Requiring Skilled Therapeutic Intervention  Body structures, Functions, Activity limitations: Decreased ROM, Decreased strength, Increased pain, Decreased posture, Decreased endurance  Assessment: Pt reports 50% improvement in neck and back spasms. Treatment Diagnosis: decreased cervical AROM, decreased UE strength, increases pain in neck and mid back and left side  Exam: NDI 22/50        Tolerance to treatment:  [x] Good   [] Fair   [] Poor  [] Fatigued   [] Increased pain   Limited by:    Goals:        Long term goals  Time Frame for Long term goals : 4 weeks  Long term goal 1: Patient will report 50% improvement in left sided neck and upper back spasms. Long term goal 2: Patient will increase cervical AROM with flexion, extension, and sidebending >/= 10 degrees for improved functional tolerance. Long term goal 3: Patient will increase strength in UEs and periscapulars >/= 1/2 manual muscle grade for improve lifting tolerance and work tolerance. Long term goal 4: NDI </= 16/50 to demonstrate functional improvements. Long term goal 5: Patient will be independent with HEP. Progress toward goals: rom  Goals Met:    []  See updated POC   Comments:    PLAN:  [x] Continue POC to pt tolerance  [] Hold PT for ___ days.   See note to physician  [] Discharge PT    Signature:   Electronically signed by Eli Warner PTA on 11/1/21 at 12:52 PM EDT    PT Individual Minutes  Time In: 7033  Time Out: 8993  Minutes: 55  Timed Code Treatment Minutes: 55 Minutes    Activity Minutes Units   Ther Ex 38 3   Manual  17  1

## 2021-11-08 ENCOUNTER — HOSPITAL ENCOUNTER (OUTPATIENT)
Dept: PHYSICAL THERAPY | Age: 51
Setting detail: THERAPIES SERIES
Discharge: HOME | End: 2021-11-08
Payer: COMMERCIAL

## 2021-11-08 PROCEDURE — 97110 THERAPEUTIC EXERCISES: CPT

## 2021-11-08 PROCEDURE — 97140 MANUAL THERAPY 1/> REGIONS: CPT

## 2021-11-08 ASSESSMENT — PAIN DESCRIPTION - FREQUENCY: FREQUENCY: CONTINUOUS

## 2021-11-08 ASSESSMENT — PAIN SCALES - GENERAL: PAINLEVEL_OUTOF10: 2

## 2021-11-08 ASSESSMENT — PAIN DESCRIPTION - LOCATION: LOCATION: NECK;HEAD

## 2021-11-08 NOTE — PROGRESS NOTES
[facility]   Outpatient Physical Therapy   Treatment Note  [] [address]  [x] [facility]            of [address]  Date: 2021  Patient: Precious Thomas  : 1970  ACCT #: [de-identified]  Referring Practitioner: Nelson Mcfarland  Diagnosis: Cervical radiculopathy  Treatment Diagnosis: decreased cervical AROM, decreased UE strength, increases pain in neck and mid back and left side     Visit Information:  PT Visit Information  PT Insurance Information: [employer]  Total # of Visits Approved: 8  Total # of Visits to Date: 10  Plan of Care/Certification Expiration Date: 21  No Show: 0  Canceled Appointment: 1  Progress Note Counter:  ( 32 units approved  units used )    SUBJECTIVE:   Subjective  Subjective: pt reprots overall pretty good week  HEP Compliance:  [x] Good [] Fair [] Poor [] Reports not doing due to:    PAIN   Location:   Pain Location: Neck, Head  Pain Rating (0-10 pain scale):  Pain Level: 2  Pain Description:   tightness  Action:  [x] Acceptable for treatment  []  Other:    OBJECTIVE:   Exercises  Exercise 3: upper trap stretch [id number] , levator scap stretch 3 x 30 seconds    Manual: []  NA   Manual therapy  Soft Tissue Mobalization: STM  suboccipital, R cervical paraspinals, swapnil scap (avoid cyst in cervical paraspinals on  left side-pt to follow up with physician), bilateral upper trap  Other: cupping jessica ut avoided cyst on the lt neck. HEP  Continue with current Home Exercise Program.  See Objective section for progression of HEP. Comments:       POST-PAIN    Pain Rating (0-10 pain scale): 0/10  Location and Pain Description same as pre-pain unless otherwise indicated.   Action: [] NA  [] Call Physician  [x] Perform HEP  [] Meds as prescribed     ASSESSMENT:     Conditions Requiring Skilled Therapeutic Intervention  Body structures, Functions, Activity limitations: Decreased ROM, Decreased strength, Increased pain, Decreased posture, Decreased endurance  Assessment: pt with some pulling neck with levator stretch this date. Treatment Diagnosis: decreased cervical AROM, decreased UE strength, increases pain in neck and mid back and left side        Tolerance to treatment:  [x] Good   [] Fair   [] Poor  [] Fatigued   [] Increased pain   Limited by:    Goals:        Long term goals  Time Frame for Long term goals : 4 weeks  Long term goal 1: Patient will report 75% improvement in left sided neck and upper back spasms. Long term goal 2: Patient will demostate cervical AROM with flexion, extension, and sidebending >/= 45 degrees for improved functional tolerance. Long term goal 3: Patient will increase strength in UEs and periscapulars to 5/5 for improve lifting tolerance and work tolerance. Long term goal 4: NDI </= 16/50 to demonstrate functional improvements. Long term goal 5: Patient will be independent with HEP. Progress toward goals: rom  Goals Met:    []  See updated POC   Comments:    PLAN:  [x] Continue POC to pt tolerance  [] Hold PT for ___ days.   See note to physician  [] Discharge PT    Signature:   Electronically signed by Galindo Proctor PTA on 11/8/21 at 11:44 AM EST    PT Individual Minutes  Time In: 1000  Time Out: 4691  Minutes: 32  Timed Code Treatment Minutes: 32 Minutes    Activity Minutes Units   Ther Ex 8 1   Manual   24  1

## 2021-11-15 ENCOUNTER — HOSPITAL ENCOUNTER (OUTPATIENT)
Dept: PHYSICAL THERAPY | Age: 51
Setting detail: THERAPIES SERIES
Discharge: HOME | End: 2021-11-15
Payer: COMMERCIAL

## 2021-11-15 PROCEDURE — 97140 MANUAL THERAPY 1/> REGIONS: CPT

## 2021-11-15 PROCEDURE — 97110 THERAPEUTIC EXERCISES: CPT

## 2021-11-15 ASSESSMENT — PAIN DESCRIPTION - LOCATION: LOCATION: NECK

## 2021-11-15 ASSESSMENT — PAIN SCALES - GENERAL: PAINLEVEL_OUTOF10: 1

## 2021-11-15 ASSESSMENT — PAIN DESCRIPTION - FREQUENCY: FREQUENCY: CONTINUOUS

## 2021-11-15 NOTE — PROGRESS NOTES
[facility]   Outpatient Physical Therapy   Treatment Note  [] [address]  [x] [facility]            of [address]  Date: 11/15/2021  Patient: Eleni Zhao  : 1970  ACCT #: [de-identified]  Referring Practitioner: Dimple Morris  Diagnosis: Cervical radiculopathy  Treatment Diagnosis: decreased cervical AROM, decreased UE strength, increases pain in neck and mid back and left side     Visit Information:  PT Visit Information  PT Insurance Information: [employer]  Total # of Visits Approved: 8  Total # of Visits to Date: 6  Plan of Care/Certification Expiration Date: 21  No Show: 0  Canceled Appointment: 1  Progress Note Counter:  ( 32 units approved 32/32 units used )    SUBJECTIVE:   Subjective  Subjective: Pt reports that he is having 1 episode of increased pain a week. HEP Compliance:  [x] Good [] Fair [] Poor [] Reports not doing due to:    PAIN   Location:   Pain Location: Neck  Pain Rating (0-10 pain scale):  Pain Level: 1  Pain Description:   tight   Action:  [x] Acceptable for treatment  []  Other:    OBJECTIVE:   Exercises  Exercise 3: upper trap stretch 1l45lgm , levator scap stretch 3 x 30 seconds  Exercise 9: barrel stretch 30 sec x3     Manual: []  NA   Manual therapy  Soft Tissue Mobalization: STM  suboccipital, R cervical paraspinals, swapnil scap (avoid cyst in cervical paraspinals on  left side-pt to follow up with physician), bilateral upper trap  Other: cupping jessica ut avoided cyst on the lt neck.        Strength: [] NT        Strength RUE  R Shoulder Flexion: 4+/5  R Shoulder Extension: 5/5  R Shoulder ABduction: 4+/5  R Elbow Flexion: 5/5  R Elbow Extension: 5/5  R Wrist Flexion: 4+/5  R Wrist Extension: 5/5  Strength LUE  L Shoulder Flexion: 4+/5  L Shoulder Extension: 5/5  L Shoulder ABduction: 4+/5  L Elbow Flexion: 5/5  L Elbow Extension: 5/5  L Wrist Flexion: 4+/5  L Wrist Extension: 5/5       ROM: [] NT  Spine  Cervical: flexion 60 deg, ext 44 deg, right SB 40 deg, left SB 44 deg    HEP  Continue with current Home Exercise Program.  See Objective section for progression of HEP. Comments:       POST-PAIN    Pain Rating (0-10 pain scale): 0/10  Location and Pain Description same as pre-pain unless otherwise indicated. Action: [] NA  [] Call Physician  [x] Perform HEP  [] Meds as prescribed     ASSESSMENT:     Conditions Requiring Skilled Therapeutic Intervention  Body structures, Functions, Activity limitations: Decreased ROM, Decreased strength, Increased pain, Decreased posture, Decreased endurance  Assessment: Pt reports 75-80% better. Pt with overall increased strength and ROM. Pt reports able to perform exercises at home to keep pain down. Pt concerned about shoulder weakness and ROM. Going to talk to dr about it. Treatment Diagnosis: decreased cervical AROM, decreased UE strength, increases pain in neck and mid back and left side        Tolerance to treatment:  [x] Good   [] Fair   [] Poor  [] Fatigued   [] Increased pain   Limited by:    Goals:        Long term goals  Time Frame for Long term goals : 4 weeks  Long term goal 1: Patient will report 75% improvement in left sided neck and upper back spasms. Long term goal 2: Patient will demostate cervical AROM with flexion, extension, and sidebending >/= 45 degrees for improved functional tolerance. Long term goal 3: Patient will increase strength in UEs and periscapulars to 5/5 for improve lifting tolerance and work tolerance. Long term goal 4: NDI </= 16/50 to demonstrate functional improvements. Long term goal 5: Patient will be independent with HEP. Progress toward goals:  Goals Met:    []  See updated POC   Comments:    PLAN:  [] Continue POC to pt tolerance  [] Hold PT for ___ days.   See note to physician  [x] Discharge PT    Signature:   Electronically signed by Donato Dumas PTA on 11/15/21 at 3:23 PM EST    PT Individual Minutes  Time In: 6586  Time Out: 1045  Minutes: 40  Timed Code Treatment Minutes: 40 Minutes    Activity Minutes Units   Ther Ex 15 1   Manual   25  2

## 2021-11-15 NOTE — PROGRESS NOTES
[address] [facility]   Radha Bain. [address] [place], [place]  Phone:  [phone]   Fax:  [phone]    [] Certification  [] Recertification []  Plan of Care  [] Progress Note   [x] Discharge        To:  Jono Olmstead  From:  Cathy Dexter PTA  Patient: Charlie Franco     : 1970  Diagnosis: Cervical radiculopathy     Date: 11/15/2021  Treatment Diagnosis: decreased cervical AROM, decreased UE strength, increases pain in neck and mid back and left side    Plan of Care/Certification Expiration Date: 21  Progress Report Period from: 21   to 11/15/2021    Total # of Visits to Date: 11   No Show: 0    Canceled Appointment: 1     OBJECTIVE:   Short Term Goals =Long term goals    Long Term Goals - Time Frame for Long term goals : 4 weeks  Goals Current/ Discharge status Met   Long term goal 1: Patient will report 75% improvement in left sided neck and upper back spasms. Pt reports 75-80% better  [x] yes  [] no   Long term goal 2: Patient will demostate cervical AROM with flexion, extension, and sidebending >/= 45 degrees for improved functional tolerance. Spine  Cervical: flexion 60 deg, ext 44 deg, right SB 40 deg, left SB 44 deg   [x] yes  [x] no   Long term goal 3: Patient will increase strength in UEs and periscapulars to 5/5 for improve lifting tolerance and work tolerance. Strength RUE  R Shoulder Flexion: 4+/5  R Shoulder Extension: 5/5  R Shoulder ABduction: 4+/5  R Elbow Flexion: 5/5  R Elbow Extension: 5/5  R Wrist Flexion: 4+/5  R Wrist Extension: 5/5  Strength LUE  L Shoulder Flexion: 4+/5  L Shoulder Extension: 5/5  L Shoulder ABduction: 4+/5  L Elbow Flexion: 5/5  L Elbow Extension: 5/5  L Wrist Flexion: 4+/5  L Wrist Extension: 5/5    [x] yes  [x] no   Long term goal 4: NDI </= 16/50 to demonstrate functional improvements. NDI 22/50 [] yes  [x] no   Long term goal 5: Patient will be independent with HEP.  I with HEP  [x] yes  [] no     Body structures, Functions, Activity limitations: Decreased ROM, Decreased strength, Increased pain, Decreased posture, Decreased endurance  Assessment: Pt reports 75-80% better. Pt with overall increased strength and ROM. Pt reports able to perform exercises at home to keep pain down. Patient is agreeable with discharge due to having more pain in shoulder recently. Educated patient to follow up with physician about shoulder pain. New Education Provided:  continue with HEP    PLAN: D/C from PT        Precautions:             Patient Status:[] Continue/ Initate plan of Care    [x] Discharge PT. Recommend pt continue with HEP. [] Additional visits requested, Please re-certify for additional visits:        Signature: Electronically signed by Berta Wang PTA on 11/15/21 at 3:24 PM EST  Electronically signed by Gris Morgan PT on 11/16/2021 at 3:43 PM      If you have any questions or concerns, please don't hesitate to call. Thank you for your referral.    I have reviewed this plan of care and certify a need for medically necessary rehabilitation services.     Physician Signature:__________________________________________________________  Date:  Please sign and return

## 2022-11-30 PROBLEM — E55.9 VITAMIN D DEFICIENCY: Status: ACTIVE | Noted: 2022-11-30

## 2022-11-30 PROBLEM — E78.5 HYPERLIPIDEMIA: Status: ACTIVE | Noted: 2022-11-30

## 2022-11-30 PROBLEM — I25.10 ARTERIOSCLEROSIS OF CORONARY ARTERY: Status: ACTIVE | Noted: 2022-11-30

## 2022-11-30 PROBLEM — I49.3 VENTRICULAR PREMATURE BEATS: Status: ACTIVE | Noted: 2022-11-30

## 2022-11-30 PROBLEM — I10 BENIGN ESSENTIAL HYPERTENSION: Status: ACTIVE | Noted: 2022-11-30

## 2022-11-30 PROBLEM — R40.0 DAYTIME SOMNOLENCE: Status: ACTIVE | Noted: 2022-11-30

## 2022-11-30 PROBLEM — E29.1 HYPOGONADISM IN MALE: Status: ACTIVE | Noted: 2022-11-30

## 2022-11-30 PROBLEM — M54.12 CERVICAL RADICULOPATHY: Status: ACTIVE | Noted: 2022-11-30

## 2022-11-30 PROBLEM — M17.9 OSTEOARTHRITIS OF KNEE: Status: ACTIVE | Noted: 2022-11-30

## 2022-11-30 PROBLEM — G60.9 IDIOPATHIC PERIPHERAL NEUROPATHY: Status: ACTIVE | Noted: 2022-11-30

## 2022-11-30 PROBLEM — K57.92 DIVERTICULITIS: Status: ACTIVE | Noted: 2022-11-30

## 2022-11-30 RX ORDER — ATORVASTATIN CALCIUM 10 MG/1
TABLET, FILM COATED ORAL
COMMUNITY
Start: 2022-11-14

## 2022-11-30 RX ORDER — ASPIRIN 81 MG/1
81 TABLET ORAL DAILY
COMMUNITY

## 2022-11-30 RX ORDER — LISINOPRIL 10 MG/1
TABLET ORAL
COMMUNITY
Start: 2022-11-14

## 2022-11-30 RX ORDER — NITROGLYCERIN 0.4 MG/1
TABLET SUBLINGUAL
COMMUNITY

## 2022-12-13 ENCOUNTER — OFFICE VISIT (OUTPATIENT)
Dept: CARDIOLOGY CLINIC | Age: 52
End: 2022-12-13
Payer: COMMERCIAL

## 2022-12-13 VITALS — DIASTOLIC BLOOD PRESSURE: 70 MMHG | HEART RATE: 75 BPM | SYSTOLIC BLOOD PRESSURE: 120 MMHG | WEIGHT: 254 LBS

## 2022-12-13 DIAGNOSIS — I25.10 CORONARY ARTERY DISEASE INVOLVING NATIVE CORONARY ARTERY OF NATIVE HEART WITHOUT ANGINA PECTORIS: ICD-10-CM

## 2022-12-13 DIAGNOSIS — E78.2 MIXED HYPERLIPIDEMIA: ICD-10-CM

## 2022-12-13 DIAGNOSIS — I10 BENIGN ESSENTIAL HYPERTENSION: Primary | ICD-10-CM

## 2022-12-13 PROCEDURE — 3074F SYST BP LT 130 MM HG: CPT | Performed by: INTERNAL MEDICINE

## 2022-12-13 PROCEDURE — 93000 ELECTROCARDIOGRAM COMPLETE: CPT | Performed by: INTERNAL MEDICINE

## 2022-12-13 PROCEDURE — 3078F DIAST BP <80 MM HG: CPT | Performed by: INTERNAL MEDICINE

## 2022-12-13 PROCEDURE — 99204 OFFICE O/P NEW MOD 45 MIN: CPT | Performed by: INTERNAL MEDICINE

## 2022-12-13 NOTE — PROGRESS NOTES
Chief Complaint   Patient presents with    Establish Cardiologist     SELF REFERRAL    PREVIOUS DR FRANCISCO PATIENT       12-13-22: Patient presents for initial medical evaluation. Patient is followed on a regular basis by Dr. Governor Vonda MD. patient with past medical history of coronary disease status post PCI at [address] [place] [address] a few years ago, HTN, HLD, KEITH. Pt denies chest pain, dyspnea, dyspnea on exertion, change in exercise capacity, fatigue,  nausea, vomiting, diarrhea, constipation, motor weakness, insomnia, weight loss, syncope, dizziness, lightheadedness, palpitations, PND, orthopnea, or claudication. No angina or CHF types symptoms. Has lost 95 pounds overall. No smoking. No DM. EKG with NSR, non specific changes. Patient Active Problem List   Diagnosis    Arteriosclerosis of coronary artery    Benign essential hypertension    Hyperlipidemia    KEITH (obstructive sleep apnea)    Osteoarthritis of knee    Idiopathic peripheral neuropathy    Ventricular premature beats    Cervical radiculopathy    Daytime somnolence    Diverticulitis    Vitamin D deficiency    Hypogonadism in male    Coronary artery disease involving native coronary artery of native heart without angina pectoris       Past Surgical History:   Procedure Laterality Date    DIAGNOSTIC CARDIAC CATH LAB PROCEDURE  01/15/2018    PTCA  01/15/2018       Social History     Socioeconomic History    Marital status:        No family history on file.     Current Outpatient Medications   Medication Sig Dispense Refill    metoprolol tartrate (LOPRESSOR) 25 MG tablet Take 1 tablet by mouth 2 times daily 180 tablet 11    lisinopril (PRINIVIL;ZESTRIL) 10 MG tablet TAKE 1 TABLET BY MOUTH EVERY DAY      atorvastatin (LIPITOR) 10 MG tablet TAKE 1 TABLET BY MOUTH ONCE DAILY      nitroGLYCERIN (NITROSTAT) 0.4 MG SL tablet Place under the tongue      aspirin 81 MG EC tablet Take 81 mg by mouth daily       No current facility-administered medications for this visit. Patient has no known allergies. Review of Systems:  General ROS: negative  Psychological ROS: negative  Hematological and Lymphatic ROS: No history of blood clots or bleeding disorder. Respiratory ROS: no cough, shortness of breath, or wheezing  Cardiovascular ROS: no chest pain or dyspnea on exertion  Gastrointestinal ROS: no abdominal pain, change in bowel habits, or black or bloody stools  Genito-Urinary ROS: no dysuria, trouble voiding, or hematuria  Musculoskeletal ROS: negative  Neurological ROS: no TIA or stroke symptoms  Dermatological ROS: negative    VITALS:  Blood pressure 120/70, pulse 75, weight 254 lb (115.2 kg). There is no height or weight on file to calculate BMI. Physical Examination:  General appearance - alert, well appearing, and in no distress  Mental status - alert, oriented to person, place, and time  Neck - Neck is supple, no JVD or carotid bruits. No thyromegaly or adenopathy. Chest - clear to auscultation, no wheezes, rales or rhonchi, symmetric air entry  Heart - normal rate, regular rhythm, normal S1, S2, no murmurs, rubs, clicks or gallops  Abdomen - soft, nontender, nondistended, no masses or organomegaly  Neurological - alert, oriented, normal speech, no focal findings or movement disorder noted  Extremities - peripheral pulses normal, no pedal edema, no clubbing or cyanosis  Skin - normal coloration and turgor, no rashes, no suspicious skin lesions noted      EKG: normal sinus rhythm, nonspecific ST and T waves changes    Orders Placed This Encounter   Procedures    EKG 12 Lead       ASSESSMENT:     Diagnosis Orders   1. Benign essential hypertension  EKG 12 Lead      2. Mixed hyperlipidemia        3. Coronary artery disease involving native coronary artery of native heart without angina pectoris              PLAN:       As always, aggressive risk factor modification is strongly recommended.  We should adhere to the JNC VIII guidelines for HTN management and the NCEP ATP III guidelines for LDL-C management. Cardiac diet is always recommended with low fat, cholesterol, calories and sodium. Continue medications at current doses. Patient was advised and encouraged to check blood pressure at home or at a pharmacy, maintain a logbook, and also call us back if blood pressure are above the target ranges or if it is low. Patient clearly understands and agrees to the instructions. We will need to continue to monitor muscle and liver enzymes, BUN, CR, and electrolytes.     Check EKG    Metoprolol 25 mg twice daily for cardioprotection

## 2023-10-27 ENCOUNTER — TRANSCRIBE ORDERS (OUTPATIENT)
Dept: ADMINISTRATIVE | Age: 53
End: 2023-10-27

## 2023-10-27 DIAGNOSIS — G50.0 TRIGEMINAL NEURALGIA: Primary | ICD-10-CM

## 2023-11-13 ENCOUNTER — HOSPITAL ENCOUNTER (OUTPATIENT)
Dept: MRI IMAGING | Age: 53
Discharge: HOME | End: 2023-11-15
Payer: COMMERCIAL

## 2023-11-13 ENCOUNTER — APPOINTMENT (OUTPATIENT)
Dept: MRI IMAGING | Age: 53
End: 2023-11-13
Payer: COMMERCIAL

## 2023-11-13 DIAGNOSIS — G50.0 TRIGEMINAL NEURALGIA: ICD-10-CM

## 2023-11-13 PROCEDURE — 70551 MRI BRAIN STEM W/O DYE: CPT

## 2023-12-05 ENCOUNTER — OFFICE VISIT (OUTPATIENT)
Dept: CARDIOLOGY CLINIC | Age: 53
End: 2023-12-05
Payer: COMMERCIAL

## 2023-12-05 VITALS
WEIGHT: 263.4 LBS | DIASTOLIC BLOOD PRESSURE: 74 MMHG | BODY MASS INDEX: 33.8 KG/M2 | HEIGHT: 74 IN | HEART RATE: 74 BPM | SYSTOLIC BLOOD PRESSURE: 130 MMHG | OXYGEN SATURATION: 97 %

## 2023-12-05 DIAGNOSIS — I10 BENIGN ESSENTIAL HYPERTENSION: ICD-10-CM

## 2023-12-05 DIAGNOSIS — I25.10 ARTERIOSCLEROSIS OF CORONARY ARTERY: ICD-10-CM

## 2023-12-05 DIAGNOSIS — I25.10 CORONARY ARTERY DISEASE INVOLVING NATIVE CORONARY ARTERY OF NATIVE HEART WITHOUT ANGINA PECTORIS: Primary | ICD-10-CM

## 2023-12-05 DIAGNOSIS — I49.3 VENTRICULAR PREMATURE BEATS: ICD-10-CM

## 2023-12-05 PROCEDURE — 3075F SYST BP GE 130 - 139MM HG: CPT | Performed by: INTERNAL MEDICINE

## 2023-12-05 PROCEDURE — 99213 OFFICE O/P EST LOW 20 MIN: CPT | Performed by: INTERNAL MEDICINE

## 2023-12-05 PROCEDURE — 3078F DIAST BP <80 MM HG: CPT | Performed by: INTERNAL MEDICINE

## 2023-12-05 PROCEDURE — 4004F PT TOBACCO SCREEN RCVD TLK: CPT | Performed by: INTERNAL MEDICINE

## 2023-12-05 PROCEDURE — 3017F COLORECTAL CA SCREEN DOC REV: CPT | Performed by: INTERNAL MEDICINE

## 2023-12-05 PROCEDURE — 93000 ELECTROCARDIOGRAM COMPLETE: CPT | Performed by: INTERNAL MEDICINE

## 2023-12-05 PROCEDURE — G8484 FLU IMMUNIZE NO ADMIN: HCPCS | Performed by: INTERNAL MEDICINE

## 2023-12-05 PROCEDURE — G8427 DOCREV CUR MEDS BY ELIG CLIN: HCPCS | Performed by: INTERNAL MEDICINE

## 2023-12-05 PROCEDURE — G8417 CALC BMI ABV UP PARAM F/U: HCPCS | Performed by: INTERNAL MEDICINE

## 2023-12-05 RX ORDER — ALBUTEROL SULFATE 90 UG/1
1 AEROSOL, METERED RESPIRATORY (INHALATION) EVERY 4 HOURS PRN
COMMUNITY
Start: 2023-01-06

## 2023-12-05 RX ORDER — FLUTICASONE PROPIONATE AND SALMETEROL 50; 250 UG/1; UG/1
POWDER RESPIRATORY (INHALATION)
COMMUNITY
Start: 2023-10-19

## 2023-12-05 NOTE — PROGRESS NOTES
Chief Complaint   Patient presents with    1 Year Follow Up       12-13-22: Patient presents for initial medical evaluation. Patient is followed on a regular basis by Dr. Christina Gambino MD. patient with past medical history of coronary disease status post PCI at [address] a few years ago, HTN, HLD, KEITH. Pt denies chest pain, dyspnea, dyspnea on exertion, change in exercise capacity, fatigue,  nausea, vomiting, diarrhea, constipation, motor weakness, insomnia, weight loss, syncope, dizziness, lightheadedness, palpitations, PND, orthopnea, or claudication. No angina or CHF types symptoms. Has lost 95 pounds overall. No smoking. No DM. EKG with NSR, non specific changes. 12-5-23: Lipid profile is within normal limits lipid profile is within normal limit 12/5/2023: Patient states he is doing well overall. No recent hospitalizations. Blood pressure is very well-controlled. history of coronary disease status post PCI at [address] a few years ago, HTN, HLD, KEITH. Not on CPAP machine. No smoking. EKG with normal sinus rhythm nonspecific ST changes    Patient Active Problem List   Diagnosis    Arteriosclerosis of coronary artery    Benign essential hypertension    Hyperlipidemia    KEITH (obstructive sleep apnea)    Osteoarthritis of knee    Idiopathic peripheral neuropathy    Ventricular premature beats    Cervical radiculopathy    Daytime somnolence    Diverticulitis    Vitamin D deficiency    Hypogonadism in male    Coronary artery disease involving native coronary artery of native heart without angina pectoris       Past Surgical History:   Procedure Laterality Date    DIAGNOSTIC CARDIAC CATH LAB PROCEDURE  01/15/2018    PTCA  01/15/2018       Social History     Socioeconomic History    Marital status:        No family history on file.     Current Outpatient Medications   Medication Sig Dispense Refill    albuterol sulfate HFA (PROVENTIL;VENTOLIN;PROAIR) 108 (90 Base)

## 2024-03-27 ENCOUNTER — HOSPITAL ENCOUNTER (EMERGENCY)
Age: 54
Discharge: HOME | End: 2024-03-27
Payer: COMMERCIAL

## 2024-03-27 ENCOUNTER — APPOINTMENT (OUTPATIENT)
Dept: CT IMAGING | Age: 54
End: 2024-03-27
Payer: COMMERCIAL

## 2024-03-27 VITALS
HEART RATE: 74 BPM | SYSTOLIC BLOOD PRESSURE: 145 MMHG | RESPIRATION RATE: 21 BRPM | TEMPERATURE: 98.1 F | OXYGEN SATURATION: 98 % | BODY MASS INDEX: 32.74 KG/M2 | WEIGHT: 255 LBS | DIASTOLIC BLOOD PRESSURE: 82 MMHG

## 2024-03-27 DIAGNOSIS — J40 BRONCHITIS: ICD-10-CM

## 2024-03-27 DIAGNOSIS — R06.00 DYSPNEA AND RESPIRATORY ABNORMALITIES: Primary | ICD-10-CM

## 2024-03-27 DIAGNOSIS — R06.89 DYSPNEA AND RESPIRATORY ABNORMALITIES: Primary | ICD-10-CM

## 2024-03-27 LAB
ALBUMIN SERPL-MCNC: 4.3 G/DL (ref 3.5–4.6)
ALP SERPL-CCNC: 45 U/L (ref 35–104)
ALT SERPL-CCNC: 27 U/L (ref 0–41)
ANION GAP SERPL CALCULATED.3IONS-SCNC: 13 MEQ/L (ref 9–15)
AST SERPL-CCNC: 19 U/L (ref 0–40)
BASOPHILS # BLD: 0 K/UL (ref 0–0.2)
BASOPHILS NFR BLD: 0.6 %
BILIRUB SERPL-MCNC: <0.2 MG/DL (ref 0.2–0.7)
BUN SERPL-MCNC: 20 MG/DL (ref 6–20)
CALCIUM SERPL-MCNC: 9.2 MG/DL (ref 8.5–9.9)
CHLORIDE SERPL-SCNC: 107 MEQ/L (ref 95–107)
CO2 SERPL-SCNC: 22 MEQ/L (ref 20–31)
CREAT SERPL-MCNC: 1.14 MG/DL (ref 0.7–1.2)
EOSINOPHIL # BLD: 0.1 K/UL (ref 0–0.7)
EOSINOPHIL NFR BLD: 1.3 %
ERYTHROCYTE [DISTWIDTH] IN BLOOD BY AUTOMATED COUNT: 13.4 % (ref 11.5–14.5)
GLOBULIN SER CALC-MCNC: 2.4 G/DL (ref 2.3–3.5)
GLUCOSE SERPL-MCNC: 116 MG/DL (ref 70–99)
HCT VFR BLD AUTO: 42.3 % (ref 42–52)
HGB BLD-MCNC: 15 G/DL (ref 14–18)
LYMPHOCYTES # BLD: 1.4 K/UL (ref 1–4.8)
LYMPHOCYTES NFR BLD: 25.8 %
MCH RBC QN AUTO: 32.5 PG (ref 27–31.3)
MCHC RBC AUTO-ENTMCNC: 35.5 % (ref 33–37)
MCV RBC AUTO: 91.8 FL (ref 79–92.2)
MONOCYTES # BLD: 0.6 K/UL (ref 0.2–0.8)
MONOCYTES NFR BLD: 10.2 %
NEUTROPHILS # BLD: 3.3 K/UL (ref 1.4–6.5)
NEUTS SEG NFR BLD: 61.9 %
PLATELET # BLD AUTO: 154 K/UL (ref 130–400)
POC CREATININE WHOLE BLOOD: 1.3
POTASSIUM SERPL-SCNC: 4 MEQ/L (ref 3.4–4.9)
PROT SERPL-MCNC: 6.7 G/DL (ref 6.3–8)
RBC # BLD AUTO: 4.61 M/UL (ref 4.7–6.1)
SODIUM SERPL-SCNC: 142 MEQ/L (ref 135–144)
TROPONIN, HIGH SENSITIVITY: 11 NG/L (ref 0–19)
WBC # BLD AUTO: 5.4 K/UL (ref 4.8–10.8)

## 2024-03-27 PROCEDURE — 6360000002 HC RX W HCPCS: Performed by: PHYSICIAN ASSISTANT

## 2024-03-27 PROCEDURE — 6360000004 HC RX CONTRAST MEDICATION: Performed by: PHYSICIAN ASSISTANT

## 2024-03-27 PROCEDURE — 93005 ELECTROCARDIOGRAM TRACING: CPT | Performed by: PHYSICIAN ASSISTANT

## 2024-03-27 PROCEDURE — 96374 THER/PROPH/DIAG INJ IV PUSH: CPT

## 2024-03-27 PROCEDURE — 80053 COMPREHEN METABOLIC PANEL: CPT

## 2024-03-27 PROCEDURE — 36415 COLL VENOUS BLD VENIPUNCTURE: CPT

## 2024-03-27 PROCEDURE — 85025 COMPLETE CBC W/AUTO DIFF WBC: CPT

## 2024-03-27 PROCEDURE — 99285 EMERGENCY DEPT VISIT HI MDM: CPT

## 2024-03-27 PROCEDURE — 71275 CT ANGIOGRAPHY CHEST: CPT

## 2024-03-27 PROCEDURE — 84484 ASSAY OF TROPONIN QUANT: CPT

## 2024-03-27 RX ORDER — AZITHROMYCIN 250 MG/1
TABLET, FILM COATED ORAL
Qty: 1 PACKET | Refills: 0 | Status: SHIPPED | OUTPATIENT
Start: 2024-03-27 | End: 2024-03-31

## 2024-03-27 RX ORDER — PREDNISONE 10 MG/1
TABLET ORAL
Qty: 30 TABLET | Refills: 0 | Status: SHIPPED | OUTPATIENT
Start: 2024-03-27

## 2024-03-27 RX ORDER — METHYLPREDNISOLONE SODIUM SUCCINATE 125 MG/2ML
125 INJECTION, POWDER, LYOPHILIZED, FOR SOLUTION INTRAMUSCULAR; INTRAVENOUS ONCE
Status: COMPLETED | OUTPATIENT
Start: 2024-03-27 | End: 2024-03-27

## 2024-03-27 RX ADMIN — IOPAMIDOL 75 ML: 755 INJECTION, SOLUTION INTRAVENOUS at 16:35

## 2024-03-27 RX ADMIN — METHYLPREDNISOLONE SODIUM SUCCINATE 125 MG: 125 INJECTION INTRAMUSCULAR; INTRAVENOUS at 16:18

## 2024-03-27 ASSESSMENT — PAIN - FUNCTIONAL ASSESSMENT
PAIN_FUNCTIONAL_ASSESSMENT: NONE - DENIES PAIN
PAIN_FUNCTIONAL_ASSESSMENT: NONE - DENIES PAIN

## 2024-03-27 ASSESSMENT — LIFESTYLE VARIABLES
HOW OFTEN DO YOU HAVE A DRINK CONTAINING ALCOHOL: NEVER
HOW MANY STANDARD DRINKS CONTAINING ALCOHOL DO YOU HAVE ON A TYPICAL DAY: PATIENT DOES NOT DRINK

## 2024-03-27 NOTE — ED NOTES
Pt discharge at this time. Pt states understanding of medications, and is educated on reaction to monitor for.  Pt educated follow up with PCP and pulmonology as directed. Pt states understanding of returning to ER if needed for worsening symptoms. Pt ambulates with slow steady gait at discharge. Pt is alert and oriented times 4 at this times. Pt left alone at this time. Pt denies any increase of symptoms at this time. Pt has no further questions at this time.

## 2024-03-28 LAB
EKG ATRIAL RATE: 72 BPM
EKG P AXIS: 54 DEGREES
EKG P-R INTERVAL: 194 MS
EKG Q-T INTERVAL: 380 MS
EKG QRS DURATION: 96 MS
EKG QTC CALCULATION (BAZETT): 416 MS
EKG R AXIS: 9 DEGREES
EKG T AXIS: 30 DEGREES
EKG VENTRICULAR RATE: 72 BPM
PERFORMED ON: NORMAL
POC CREATININE: 1.3 MG/DL (ref 0.8–1.3)
POC SAMPLE TYPE: NORMAL

## 2024-03-28 ASSESSMENT — ENCOUNTER SYMPTOMS
VOMITING: 0
ABDOMINAL PAIN: 0
RHINORRHEA: 0
DIARRHEA: 0
SHORTNESS OF BREATH: 1
PHOTOPHOBIA: 0
NAUSEA: 0
COUGH: 1
EYE PAIN: 0
BACK PAIN: 0
SORE THROAT: 0

## 2024-03-28 NOTE — ED PROVIDER NOTES
[facility] ED  eMERGENCY dEPARTMENTeNCOUnter      Pt Name: Griffin Herrmann  MRN: [id number]  Birthdate 1970  Date ofevaluation: 3/27/2024  Provider: Yaneth Cuevas PA-C    CHIEF COMPLAINT       Chief Complaint   Patient presents with    Shortness of Breath     \"For a while\"          HISTORY OF PRESENT ILLNESS   (Location/Symptom, Timing/Onset,Context/Setting, Quality, Duration, Modifying Factors, Severity)  Note limiting factors.   Griffin Herrmann is a 54 y.o. male who presents to the emergency department sob and cough x1 month. Pt states he can feel sob at rest or with exertion. He has been using spiriva and his proair inhaler. Pmh of asthma. He denies ever having and chest pain. Denies recent travel or leg swelling. No fevers. FH of lung cancer.     HPI    NursingNotes were reviewed.    REVIEW OF SYSTEMS    (2-9 systems for level 4, 10 or more for level 5)     Review of Systems   Constitutional:  Negative for chills, diaphoresis, fatigue and fever.   HENT:  Negative for congestion, rhinorrhea and sore throat.    Eyes:  Negative for photophobia and pain.   Respiratory:  Positive for cough and shortness of breath.    Cardiovascular:  Negative for chest pain and palpitations.   Gastrointestinal:  Negative for abdominal pain, diarrhea, nausea and vomiting.   Genitourinary:  Negative for dysuria and flank pain.   Musculoskeletal:  Negative for back pain.   Skin:  Negative for rash.   Neurological:  Negative for dizziness, light-headedness and headaches.   Psychiatric/Behavioral: Negative.     All other systems reviewed and are negative.      Except as noted above the remainder of the review of systems was reviewed and negative.       PAST MEDICAL HISTORY     Past Medical History:   Diagnosis Date    Coronary artery disease involving native coronary artery of native heart without angina pectoris 12/13/2022         SURGICALHISTORY       Past Surgical History:   Procedure Laterality Date    DIAGNOSTIC

## 2024-06-03 ENCOUNTER — APPOINTMENT (OUTPATIENT)
Dept: RADIOLOGY | Facility: HOSPITAL | Age: 54
End: 2024-06-03
Payer: MEDICARE

## 2024-07-05 ENCOUNTER — HOSPITAL ENCOUNTER (OUTPATIENT)
Dept: RADIOLOGY | Facility: CLINIC | Age: 54
Discharge: HOME | End: 2024-07-05
Payer: MEDICARE

## 2024-07-05 DIAGNOSIS — R06.02 SHORTNESS OF BREATH: ICD-10-CM

## 2024-07-22 ENCOUNTER — OFFICE VISIT (OUTPATIENT)
Dept: PULMONOLOGY | Age: 54
End: 2024-07-22
Payer: COMMERCIAL

## 2024-07-22 VITALS
OXYGEN SATURATION: 96 % | TEMPERATURE: 98 F | HEART RATE: 68 BPM | HEIGHT: 74 IN | BODY MASS INDEX: 33.39 KG/M2 | WEIGHT: 260.2 LBS | RESPIRATION RATE: 16 BRPM | SYSTOLIC BLOOD PRESSURE: 136 MMHG | DIASTOLIC BLOOD PRESSURE: 82 MMHG

## 2024-07-22 DIAGNOSIS — E66.09 CLASS 1 OBESITY DUE TO EXCESS CALORIES WITHOUT SERIOUS COMORBIDITY WITH BODY MASS INDEX (BMI) OF 34.0 TO 34.9 IN ADULT: ICD-10-CM

## 2024-07-22 DIAGNOSIS — G47.33 OSA (OBSTRUCTIVE SLEEP APNEA): ICD-10-CM

## 2024-07-22 DIAGNOSIS — J45.40 MODERATE PERSISTENT ASTHMA WITHOUT COMPLICATION: ICD-10-CM

## 2024-07-22 DIAGNOSIS — J45.40 MODERATE PERSISTENT ASTHMA WITHOUT COMPLICATION: Primary | ICD-10-CM

## 2024-07-22 LAB
BASOPHILS # BLD: 0 K/UL (ref 0–0.2)
BASOPHILS NFR BLD: 0.4 %
EOSINOPHIL # BLD: 0.1 K/UL (ref 0–0.7)
EOSINOPHIL NFR BLD: 1.5 %
ERYTHROCYTE [DISTWIDTH] IN BLOOD BY AUTOMATED COUNT: 13.5 % (ref 11.5–14.5)
HCT VFR BLD AUTO: 43 % (ref 42–52)
HGB BLD-MCNC: 15.3 G/DL (ref 14–18)
LYMPHOCYTES # BLD: 1.8 K/UL (ref 1–4.8)
LYMPHOCYTES NFR BLD: 39.3 %
MCH RBC QN AUTO: 32.8 PG (ref 27–31.3)
MCHC RBC AUTO-ENTMCNC: 35.6 % (ref 33–37)
MCV RBC AUTO: 92.3 FL (ref 79–92.2)
MONOCYTES # BLD: 0.5 K/UL (ref 0.2–0.8)
MONOCYTES NFR BLD: 11.1 %
NEUTROPHILS # BLD: 2.2 K/UL (ref 1.4–6.5)
NEUTS SEG NFR BLD: 47.3 %
PLATELET # BLD AUTO: 146 K/UL (ref 130–400)
RBC # BLD AUTO: 4.66 M/UL (ref 4.7–6.1)
WBC # BLD AUTO: 4.7 K/UL (ref 4.8–10.8)

## 2024-07-22 PROCEDURE — 99204 OFFICE O/P NEW MOD 45 MIN: CPT | Performed by: INTERNAL MEDICINE

## 2024-07-22 PROCEDURE — 3079F DIAST BP 80-89 MM HG: CPT | Performed by: INTERNAL MEDICINE

## 2024-07-22 PROCEDURE — 3075F SYST BP GE 130 - 139MM HG: CPT | Performed by: INTERNAL MEDICINE

## 2024-07-22 RX ORDER — FLUTICASONE FUROATE AND VILANTEROL 100; 25 UG/1; UG/1
1 POWDER RESPIRATORY (INHALATION) DAILY
Qty: 1 EACH | Refills: 3 | Status: SHIPPED | OUTPATIENT
Start: 2024-07-22

## 2024-07-22 NOTE — PROGRESS NOTES
Subjective:     Griffin Herrmann is a 54 y.o. male who complains today of:     Chief Complaint   Patient presents with    New Patient     Asthma       HPI  Patient presents for asthma    7/22/2024  Patient presents for evaluation of asthma, he had recent asthma exacerbation requiring ED visit and a tapered dose prednisone.  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  He has history of KEITH, he was treated with CPAP briefly but he could not tolerate that he uvulopalatopharyngoplasty UPPP, he lost weight also significantly.  Did not repeat sleep study since then.         Allergies:  Pregabalin and Duloxetine  Past Medical History:   Diagnosis Date    Coronary artery disease involving native coronary artery of native heart without angina pectoris 12/13/2022    Hyperlipidemia      Past Surgical History:   Procedure Laterality Date    DIAGNOSTIC CARDIAC CATH LAB PROCEDURE  01/15/2018    PTCA  01/15/2018     Family History   Problem Relation Age of Onset    Diabetes Mother     Lung Disease Father     Cancer Father      Social History     Socioeconomic History    Marital status:      Spouse name: Not on file    Number of children: Not on file    Years of education: Not on file    Highest education level: Not on file   Occupational History    Not on file   Tobacco Use    Smoking status: Never    Smokeless tobacco: Never   Vaping Use    Vaping Use: Not on file   Substance and Sexual Activity    Alcohol use: Never    Drug use: Yes     Types: Marijuana (Weed)    Sexual activity: Not on file   Other Topics Concern    Not on file   Social History Narrative    Not on file     Social Determinants of Health

## 2024-07-25 LAB
A ALTERNATA IGE QN: <0.1 KU/L (ref 0–0.34)
A FUMIGATUS IGE QN: 0.14 KU/L (ref 0–0.34)
AMER SYCAMORE IGE QN: 5.83 KU/L (ref 0–0.34)
BERMUDA GRASS IGE QN: 8.17 KU/L (ref 0–0.34)
BOXELDER IGE QN: 5.48 KU/L (ref 0–0.34)
C SPHAEROSPERMUM IGE QN: 0.13 KUL/L (ref 0–0.34)
CALIF WALNUT IGE QN: 5.47 KU/L (ref 0–0.34)
CAT DANDER IGE QN: 27.9 KU/L (ref 0–0.34)
CMN PIGWEED IGE QN: 5.42 KU/L (ref 0–0.34)
COMMON RAGWEED IGE QN: 6.22 KU/L (ref 0–0.34)
COTTONWOOD IGE QN: 4.18 KU/L (ref 0–0.34)
D FARINAE IGE QN: 6.11 KU/L (ref 0–0.34)
D PTERONYSS IGE QN: 4.3 KU/L (ref 0–0.34)
DOG DANDER IGE QN: 1 KU/L (ref 0–0.34)
IGE SERPL-ACNC: 1264 IU/ML (ref 0–100)
M RACEMOSUS IGE QN: 3.09 KU/L (ref 0–0.34)
MOUSE EPITH IGE QN: <0.1 KU/L (ref 0–0.34)
P NOTATUM IGE QN: <0.1 KU/L (ref 0–0.34)
PECAN/HICK TREE IGE QN: 4.42 KU/L (ref 0–0.34)
RED CEDAR IGE QN: 9.26 KU/L (ref 0–0.34)
ROACH IGE QN: 2.58 KU/L (ref 0–0.34)
SALTWORT IGE QN: 4.6 KU/L (ref 0–0.34)
SHEEP SORREL IGE QN: 6.89 KU/L (ref 0–0.34)
SILVER BIRCH IGE QN: 4.27 KU/L (ref 0–0.34)
TIMOTHY IGE QN: 5.79 KU/L (ref 0–0.34)
WHITE ASH IGE QN: 5.76 KU/L (ref 0–0.34)
WHITE ELM IGE QN: 12.1 KU/L (ref 0–0.34)
WHITE MULBERRY IGE QN: 4.42 KU/L (ref 0–0.34)
WHITE OAK IGE QN: 7.59 KU/L (ref 0–0.34)

## 2024-09-30 ENCOUNTER — HOSPITAL ENCOUNTER (OUTPATIENT)
Dept: GENERAL RADIOLOGY | Age: 54
Discharge: HOME | End: 2024-10-02
Attending: INTERNAL MEDICINE
Payer: COMMERCIAL

## 2024-09-30 ENCOUNTER — HOSPITAL ENCOUNTER (OUTPATIENT)
Dept: PULMONOLOGY | Age: 54
Discharge: HOME | End: 2024-09-30
Attending: INTERNAL MEDICINE
Payer: COMMERCIAL

## 2024-09-30 DIAGNOSIS — M54.50 LOW BACK PAIN, UNSPECIFIED BACK PAIN LATERALITY, UNSPECIFIED CHRONICITY, UNSPECIFIED WHETHER SCIATICA PRESENT: ICD-10-CM

## 2024-09-30 DIAGNOSIS — J45.40 MODERATE PERSISTENT ASTHMA WITHOUT COMPLICATION: ICD-10-CM

## 2024-09-30 PROCEDURE — 6360000002 HC RX W HCPCS

## 2024-09-30 PROCEDURE — 94726 PLETHYSMOGRAPHY LUNG VOLUMES: CPT

## 2024-09-30 PROCEDURE — 94060 EVALUATION OF WHEEZING: CPT

## 2024-09-30 PROCEDURE — 94729 DIFFUSING CAPACITY: CPT

## 2024-09-30 PROCEDURE — 72110 X-RAY EXAM L-2 SPINE 4/>VWS: CPT

## 2024-09-30 RX ORDER — ALBUTEROL SULFATE 0.83 MG/ML
SOLUTION RESPIRATORY (INHALATION)
Status: COMPLETED
Start: 2024-09-30 | End: 2024-09-30

## 2024-09-30 RX ADMIN — ALBUTEROL SULFATE 2.5 MG: 2.5 SOLUTION RESPIRATORY (INHALATION) at 08:54

## 2024-10-21 ENCOUNTER — OFFICE VISIT (OUTPATIENT)
Dept: PULMONOLOGY | Age: 54
End: 2024-10-21
Payer: COMMERCIAL

## 2024-10-21 VITALS
RESPIRATION RATE: 16 BRPM | BODY MASS INDEX: 33.88 KG/M2 | OXYGEN SATURATION: 95 % | TEMPERATURE: 97.6 F | DIASTOLIC BLOOD PRESSURE: 89 MMHG | WEIGHT: 264 LBS | HEART RATE: 75 BPM | HEIGHT: 74 IN | SYSTOLIC BLOOD PRESSURE: 133 MMHG

## 2024-10-21 DIAGNOSIS — E66.811 CLASS 1 OBESITY DUE TO EXCESS CALORIES WITHOUT SERIOUS COMORBIDITY WITH BODY MASS INDEX (BMI) OF 34.0 TO 34.9 IN ADULT: ICD-10-CM

## 2024-10-21 DIAGNOSIS — E66.09 CLASS 1 OBESITY DUE TO EXCESS CALORIES WITHOUT SERIOUS COMORBIDITY WITH BODY MASS INDEX (BMI) OF 34.0 TO 34.9 IN ADULT: ICD-10-CM

## 2024-10-21 DIAGNOSIS — J45.40 MODERATE PERSISTENT ASTHMA WITHOUT COMPLICATION: Primary | ICD-10-CM

## 2024-10-21 DIAGNOSIS — G47.33 OSA (OBSTRUCTIVE SLEEP APNEA): ICD-10-CM

## 2024-10-21 PROCEDURE — 99214 OFFICE O/P EST MOD 30 MIN: CPT | Performed by: INTERNAL MEDICINE

## 2024-10-21 PROCEDURE — 3079F DIAST BP 80-89 MM HG: CPT | Performed by: INTERNAL MEDICINE

## 2024-10-21 PROCEDURE — 3075F SYST BP GE 130 - 139MM HG: CPT | Performed by: INTERNAL MEDICINE

## 2024-10-21 NOTE — PROGRESS NOTES
Subjective:     Griffin Herrmann is a 54 y.o. male who complains today of:     Chief Complaint   Patient presents with    Follow-up     6 Week F/U for Moderate persistent asthma     Results     PFT and Labs       HPI  Patient presents for asthma      10/21/2027  Presents for follow-up, doing better, he is currently on Breo tolerating well, symptoms controlled, no coughing, no chest pain, no shortness of breath, not using rescue inhaler, no heartburn, no lower extremity edema, no nasal congestion or postnasal drip        7/22/2024  Patient presents for evaluation of asthma, he had recent asthma exacerbation requiring ED visit and a tapered dose prednisone.  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  He has history of KEITH, he was treated with CPAP briefly but he could not tolerate that he uvulopalatopharyngoplasty UPPP, he lost weight also significantly.  Did not repeat sleep study since then.         Allergies:  Pregabalin and Duloxetine  Past Medical History:   Diagnosis Date    Coronary artery disease involving native coronary artery of native heart without angina pectoris 12/13/2022    Hyperlipidemia      Past Surgical History:   Procedure Laterality Date    DIAGNOSTIC CARDIAC CATH LAB PROCEDURE  01/15/2018    PTCA  01/15/2018     Family History   Problem Relation Age of Onset    Diabetes Mother     Lung Disease Father     Cancer Father      Social History     Socioeconomic History    Marital status:      Spouse name: Not on file    Number of children: Not on file    Years of education: Not on file    Highest education level: Not on file   Occupational History    Not on file   Tobacco Use

## 2024-12-10 ENCOUNTER — OFFICE VISIT (OUTPATIENT)
Dept: CARDIOLOGY CLINIC | Age: 54
End: 2024-12-10
Payer: COMMERCIAL

## 2024-12-10 VITALS
HEART RATE: 90 BPM | BODY MASS INDEX: 34.54 KG/M2 | WEIGHT: 269 LBS | OXYGEN SATURATION: 97 % | DIASTOLIC BLOOD PRESSURE: 82 MMHG | SYSTOLIC BLOOD PRESSURE: 130 MMHG

## 2024-12-10 DIAGNOSIS — I10 BENIGN ESSENTIAL HYPERTENSION: ICD-10-CM

## 2024-12-10 DIAGNOSIS — M54.10 RADICULOPATHY AFFECTING UPPER EXTREMITY: ICD-10-CM

## 2024-12-10 DIAGNOSIS — I25.10 CORONARY ARTERY DISEASE INVOLVING NATIVE CORONARY ARTERY OF NATIVE HEART WITHOUT ANGINA PECTORIS: Primary | ICD-10-CM

## 2024-12-10 DIAGNOSIS — E78.2 MIXED HYPERLIPIDEMIA: ICD-10-CM

## 2024-12-10 DIAGNOSIS — R94.31 ABNORMAL ELECTROCARDIOGRAPHY: ICD-10-CM

## 2024-12-10 PROCEDURE — 99214 OFFICE O/P EST MOD 30 MIN: CPT | Performed by: INTERNAL MEDICINE

## 2024-12-10 PROCEDURE — 93000 ELECTROCARDIOGRAM COMPLETE: CPT | Performed by: INTERNAL MEDICINE

## 2024-12-10 PROCEDURE — 3075F SYST BP GE 130 - 139MM HG: CPT | Performed by: INTERNAL MEDICINE

## 2024-12-10 PROCEDURE — 3079F DIAST BP 80-89 MM HG: CPT | Performed by: INTERNAL MEDICINE

## 2024-12-10 NOTE — PROGRESS NOTES
Socioeconomic History    Marital status:    Tobacco Use    Smoking status: Never    Smokeless tobacco: Never   Substance and Sexual Activity    Alcohol use: Never    Drug use: Yes     Types: Marijuana (Weed)     Social Determinants of Health     Financial Resource Strain: Low Risk  (10/27/2023)    Received from [facility]    Overall Financial Resource Strain (CARDIA)     Difficulty of Paying Living Expenses: Not very hard   Food Insecurity: No Food Insecurity (10/27/2023)    Received from [facility]    Hunger Vital Sign     Worried About Running Out of Food in the Last Year: Never true     Ran Out of Food in the Last Year: Never true   Transportation Needs: No Transportation Needs (10/27/2023)    Received from [facility]    PRAPARE - Transportation     Lack of Transportation (Medical): No     Lack of Transportation (Non-Medical): No   Physical Activity: Inactive (10/27/2023)    Received from [facility]    Exercise Vital Sign     Days of Exercise per Week: 1 day     Minutes of Exercise per Session: 0 min   Stress: Stress Concern Present (10/27/2023)    Received from [facility] - Occupational Stress Questionnaire     Feeling of Stress : Very much   Social Connections: Socially Isolated (10/27/2023)    Received from [facility]    Social Connection and Isolation Panel [NHANES]     Frequency of Communication with Friends and Family: Never     Frequency of Social Gatherings with Friends and Family: Never     Attends [employer] Services: Never     Active Member of Clubs or Organizations: No     Attends Club or Organization Meetings: Never     Marital Status:    Intimate Partner Violence: Not At Risk (10/27/2023)    Received from [facility]    Humiliation, Afraid, Rape, and Kick questionnaire     Fear of Current

## 2024-12-18 ENCOUNTER — HOSPITAL ENCOUNTER (OUTPATIENT)
Dept: GENERAL RADIOLOGY | Age: 54
Discharge: HOME | End: 2024-12-20
Payer: COMMERCIAL

## 2024-12-18 ENCOUNTER — TRANSCRIBE ORDERS (OUTPATIENT)
Dept: GENERAL RADIOLOGY | Age: 54
End: 2024-12-18

## 2024-12-18 DIAGNOSIS — M54.12 CERVICAL RADICULOPATHY: ICD-10-CM

## 2024-12-18 DIAGNOSIS — M54.12 CERVICAL RADICULOPATHY: Primary | ICD-10-CM

## 2024-12-18 PROCEDURE — 72040 X-RAY EXAM NECK SPINE 2-3 VW: CPT

## 2025-01-08 ENCOUNTER — TRANSCRIBE ORDERS (OUTPATIENT)
Dept: GENERAL RADIOLOGY | Age: 55
End: 2025-01-08

## 2025-01-08 ENCOUNTER — HOSPITAL ENCOUNTER (OUTPATIENT)
Dept: GENERAL RADIOLOGY | Age: 55
Discharge: HOME | End: 2025-01-10
Payer: COMMERCIAL

## 2025-01-08 DIAGNOSIS — J40 BRONCHITIS, NOT SPECIFIED AS ACUTE OR CHRONIC: ICD-10-CM

## 2025-01-08 DIAGNOSIS — J40 BRONCHITIS, NOT SPECIFIED AS ACUTE OR CHRONIC: Primary | ICD-10-CM

## 2025-01-08 PROCEDURE — 71046 X-RAY EXAM CHEST 2 VIEWS: CPT

## 2025-01-22 ENCOUNTER — OFFICE VISIT (OUTPATIENT)
Age: 55
End: 2025-01-22
Payer: COMMERCIAL

## 2025-01-22 VITALS
HEIGHT: 74 IN | HEART RATE: 81 BPM | TEMPERATURE: 97.5 F | OXYGEN SATURATION: 98 % | SYSTOLIC BLOOD PRESSURE: 130 MMHG | DIASTOLIC BLOOD PRESSURE: 82 MMHG | BODY MASS INDEX: 33.5 KG/M2 | WEIGHT: 261 LBS

## 2025-01-22 DIAGNOSIS — M25.561 ACUTE PAIN OF RIGHT KNEE: Primary | ICD-10-CM

## 2025-01-22 DIAGNOSIS — M25.561 ACUTE PAIN OF RIGHT KNEE: ICD-10-CM

## 2025-01-22 LAB
ERYTHROCYTE [SEDIMENTATION RATE] IN BLOOD BY WESTERGREN METHOD: 8 MM (ref 0–20)
URATE SERPL-MCNC: 6.3 MG/DL (ref 3.4–7)

## 2025-01-22 PROCEDURE — 3017F COLORECTAL CA SCREEN DOC REV: CPT | Performed by: FAMILY MEDICINE

## 2025-01-22 PROCEDURE — 1036F TOBACCO NON-USER: CPT | Performed by: FAMILY MEDICINE

## 2025-01-22 PROCEDURE — 99213 OFFICE O/P EST LOW 20 MIN: CPT | Performed by: FAMILY MEDICINE

## 2025-01-22 PROCEDURE — 3075F SYST BP GE 130 - 139MM HG: CPT | Performed by: FAMILY MEDICINE

## 2025-01-22 PROCEDURE — 3079F DIAST BP 80-89 MM HG: CPT | Performed by: FAMILY MEDICINE

## 2025-01-22 PROCEDURE — G8427 DOCREV CUR MEDS BY ELIG CLIN: HCPCS | Performed by: FAMILY MEDICINE

## 2025-01-22 PROCEDURE — G8417 CALC BMI ABV UP PARAM F/U: HCPCS | Performed by: FAMILY MEDICINE

## 2025-01-22 RX ORDER — PREDNISONE 10 MG/1
TABLET ORAL
Qty: 30 TABLET | Refills: 0 | Status: SHIPPED | OUTPATIENT
Start: 2025-01-22

## 2025-01-22 SDOH — ECONOMIC STABILITY: FOOD INSECURITY: WITHIN THE PAST 12 MONTHS, YOU WORRIED THAT YOUR FOOD WOULD RUN OUT BEFORE YOU GOT MONEY TO BUY MORE.: NEVER TRUE

## 2025-01-22 SDOH — ECONOMIC STABILITY: FOOD INSECURITY: WITHIN THE PAST 12 MONTHS, THE FOOD YOU BOUGHT JUST DIDN'T LAST AND YOU DIDN'T HAVE MONEY TO GET MORE.: NEVER TRUE

## 2025-01-22 ASSESSMENT — PATIENT HEALTH QUESTIONNAIRE - PHQ9
SUM OF ALL RESPONSES TO PHQ9 QUESTIONS 1 & 2: 0
SUM OF ALL RESPONSES TO PHQ QUESTIONS 1-9: 0
1. LITTLE INTEREST OR PLEASURE IN DOING THINGS: NOT AT ALL
2. FEELING DOWN, DEPRESSED OR HOPELESS: NOT AT ALL

## 2025-01-22 NOTE — PROGRESS NOTES
Subjective:      Patient ID: Griffin Herrmann is a 55 y.o. male    HPI  Here with acute visit.  Routinely seen by primary care in [place].   Has felt 2 weeks of right knee feeling loose.  No injury.  No locking or giving out.   Looks like slightly swollen and taking tylenol for pain which does help.  No fever or chills currently.  Did have flu around donnie--does have appt with his primary on Monday of next week    Review of Systems   Constitutional:  Negative for chills and fever.   Musculoskeletal:  Positive for arthralgias.   Skin:  Negative for rash.     Reviewed allergy, medical, social, surgical, family and med list changes and updated   Files     Social History     Socioeconomic History    Marital status:    Tobacco Use    Smoking status: Never    Smokeless tobacco: Never   Substance and Sexual Activity    Alcohol use: Never    Drug use: Yes     Types: Marijuana (Weed)     Social Determinants of Health     Financial Resource Strain: Low Risk  (10/27/2023)    Received from [facility]    Overall Financial Resource Strain (CARDIA)     Difficulty of Paying Living Expenses: Not very hard   Food Insecurity: No Food Insecurity (1/22/2025)    Hunger Vital Sign     Worried About Running Out of Food in the Last Year: Never true     Ran Out of Food in the Last Year: Never true   Transportation Needs: No Transportation Needs (1/22/2025)    PRAPARE - Transportation     Lack of Transportation (Medical): No     Lack of Transportation (Non-Medical): No   Physical Activity: Inactive (10/27/2023)    Received from [facility]    Exercise Vital Sign     Days of Exercise per Week: 1 day     Minutes of Exercise per Session: 0 min   Stress: Stress Concern Present (10/27/2023)    Received from [facility]    [employer] [place] [employer] - Occupational Stress Questionnaire     Feeling of Stress : Very much   Social Connections: Socially Isolated

## 2025-02-12 ENCOUNTER — OFFICE VISIT (OUTPATIENT)
Dept: ORTHOPEDIC SURGERY | Age: 55
End: 2025-02-12

## 2025-02-12 VITALS — WEIGHT: 258 LBS | BODY MASS INDEX: 33.11 KG/M2 | HEIGHT: 74 IN

## 2025-02-12 DIAGNOSIS — M17.11 PRIMARY OSTEOARTHRITIS OF RIGHT KNEE: ICD-10-CM

## 2025-02-12 DIAGNOSIS — M25.461 EFFUSION OF RIGHT KNEE: Primary | ICD-10-CM

## 2025-02-12 RX ORDER — TRIAMCINOLONE ACETONIDE 40 MG/ML
80 INJECTION, SUSPENSION INTRA-ARTICULAR; INTRAMUSCULAR ONCE
Status: COMPLETED | OUTPATIENT
Start: 2025-02-12 | End: 2025-02-12

## 2025-02-12 RX ORDER — LIDOCAINE HYDROCHLORIDE 10 MG/ML
3 INJECTION, SOLUTION INFILTRATION; PERINEURAL ONCE
Status: COMPLETED | OUTPATIENT
Start: 2025-02-12 | End: 2025-02-12

## 2025-02-12 RX ADMIN — LIDOCAINE HYDROCHLORIDE 3 ML: 10 INJECTION, SOLUTION INFILTRATION; PERINEURAL at 09:00

## 2025-02-12 RX ADMIN — TRIAMCINOLONE ACETONIDE 80 MG: 40 INJECTION, SUSPENSION INTRA-ARTICULAR; INTRAMUSCULAR at 09:00

## 2025-02-12 ASSESSMENT — ENCOUNTER SYMPTOMS: BACK PAIN: 0

## 2025-02-13 RX ORDER — NAPROXEN 500 MG/1
500 TABLET ORAL 2 TIMES DAILY WITH MEALS
Qty: 60 TABLET | Refills: 1 | Status: SHIPPED | OUTPATIENT
Start: 2025-02-13

## 2025-04-01 ENCOUNTER — TELEPHONE (OUTPATIENT)
Age: 55
End: 2025-04-01

## 2025-04-01 NOTE — TELEPHONE ENCOUNTER
Pt called in and states that he is not able to do the exercise stress test because he has a knee injury going on and is not able to get on the treadmill. Please advise

## 2025-04-02 ENCOUNTER — OFFICE VISIT (OUTPATIENT)
Dept: ORTHOPEDIC SURGERY | Age: 55
End: 2025-04-02

## 2025-04-02 VITALS — BODY MASS INDEX: 33.37 KG/M2 | HEIGHT: 74 IN | WEIGHT: 260 LBS

## 2025-04-02 DIAGNOSIS — R07.9 CHEST PAIN, UNSPECIFIED TYPE: Primary | ICD-10-CM

## 2025-04-02 DIAGNOSIS — M17.11 PRIMARY OSTEOARTHRITIS OF RIGHT KNEE: Primary | ICD-10-CM

## 2025-04-02 DIAGNOSIS — M17.11 PRIMARY OSTEOARTHRITIS OF RIGHT KNEE: ICD-10-CM

## 2025-04-02 DIAGNOSIS — M25.461 EFFUSION OF RIGHT KNEE: Primary | ICD-10-CM

## 2025-04-02 RX ORDER — KETOROLAC TROMETHAMINE 30 MG/ML
30 INJECTION, SOLUTION INTRAMUSCULAR; INTRAVENOUS ONCE
Status: COMPLETED | OUTPATIENT
Start: 2025-04-02 | End: 2025-04-02

## 2025-04-02 RX ORDER — LIDOCAINE HYDROCHLORIDE 10 MG/ML
3 INJECTION, SOLUTION INFILTRATION; PERINEURAL ONCE
Status: COMPLETED | OUTPATIENT
Start: 2025-04-02 | End: 2025-04-02

## 2025-04-02 RX ADMIN — LIDOCAINE HYDROCHLORIDE 3 ML: 10 INJECTION, SOLUTION INFILTRATION; PERINEURAL at 10:45

## 2025-04-02 RX ADMIN — KETOROLAC TROMETHAMINE 30 MG: 30 INJECTION, SOLUTION INTRAMUSCULAR; INTRAVENOUS at 10:45

## 2025-04-02 ASSESSMENT — ENCOUNTER SYMPTOMS: BACK PAIN: 0

## 2025-04-02 NOTE — PROGRESS NOTES
[facility] PHYSICIANS [place] [facility], [facility] ORTHOPEDICS AND SPORTS MEDICINE  [address]  [address]  [facility] [address]  Dept: [phone]  Dept Fax: [phone]  Loc: [phone]     4/2/2025    Visit type: Follow up    Reason for Visit: Follow-up (Pt states there is mild relief from cortisone inj, though swelling didn't go away, pain 8/10 difficulty of walking, feels like his kneecap is moving.  )         Patient: Griffin Herrmann is a 55 y.o. male     HPI: 55-year-old male presents for follow-up visit pertaining to right knee.  Patient states he had return of swelling and fluid since the last aspiration of his knee effusion.  Patient also reports mild improvement from cortisone injection    ASSESSMENT/PLAN   Effusion of right knee  -     lidocaine 1 % injection 3 mL; 3 mL, Intra-artICUlar, ONCE, 1 dose, On Wed 4/2/25 at 1115  -     ketorolac (TORADOL) injection 30 mg; 30 mg, Intra-artICUlar, ONCE, 1 dose, On Wed 4/2/25 at 1115Do not administer for more than 5 days  -     DRAIN/INJECT LARGE JOINT/BURSA  Primary osteoarthritis of right knee  -     lidocaine 1 % injection 3 mL; 3 mL, Intra-artICUlar, ONCE, 1 dose, On Wed 4/2/25 at 1115  -     ketorolac (TORADOL) injection 30 mg; 30 mg, Intra-artICUlar, ONCE, 1 dose, On Wed 4/2/25 at 1115Do not administer for more than 5 days  -     DRAIN/INJECT LARGE JOINT/BURSA     -Pertinent physical exam: Tenderness to palpation of the medial joint line and lateral joint line of the right knee.  Effusion is noted.  Cruciate and collateral ligament stressing unremarkable.    -Treatment options were discussed and all questions were answered  -Discussed use of ice and heat as needed, discussed activity modification  -Oral/topical medications: Previously prescribed naproxen  -Physical therapy: Physician driven home program provided previously  -Imaging: Degenerative changes of the right knee noted on x-ray  -Bracing: Patient has a brace at

## 2025-04-07 ENCOUNTER — TELEPHONE (OUTPATIENT)
Age: 55
End: 2025-04-07

## 2025-04-07 NOTE — TELEPHONE ENCOUNTER
SPOKE WITH HUA MEANS @ [facility] [phone]  CASE# [id number]  DUROLANE REQUEST WAS DENIED DUE TO THE PROVIDER NOT BEING IN NETWORK.     EMAIL SENT TO [facility] TO NOTIFY OF ISSUE.     I WILL RESUBMIT ONCE THIS IS RESOLVED WITH [facility].

## 2025-04-14 ENCOUNTER — HOSPITAL ENCOUNTER (OUTPATIENT)
Dept: PHYSICAL THERAPY | Age: 55
Setting detail: THERAPIES SERIES
Discharge: HOME | End: 2025-04-14
Payer: COMMERCIAL

## 2025-04-14 PROCEDURE — 97110 THERAPEUTIC EXERCISES: CPT

## 2025-04-14 PROCEDURE — 97162 PT EVAL MOD COMPLEX 30 MIN: CPT

## 2025-04-14 NOTE — PLAN OF CARE
Physical Therapy Evaluation/Plan of Care   [facility] [place] - PT  [address]  [place] [address]  Dept: [phone]  Dept Fax: [phone]  Loc: [phone]    Physical Therapy: Initial Evaluation    General Information    Patient: Griffin Herrmann (55 y.o.     male)   Examination Date: 2025   :  1970 ;    Confirmed: Yes MRN: [id number]  CSN: [id number]   Insurance: Payor: [employer] BRONSON / Plan: [employer] BR[employer]SON / Product Type: *No Product type* /   Insurance ID: [id number] - (Commercial)    Secondary Insurance (if applicable):     Referring Physician: Amelia Lea APRN - CNP       Visits to Date/Visits Approved:  (EVAL + 12 visits >needs authorization (limit 20 visits per calendar year) $20 co-pay)    No Show/Cancelled Appts: 0 / 0     Medical Diagnosis: Cervicalgia [M54.2]  Cervicogenic headache [G44.86]        Treatment Diagnosis: Cervical pain with decreased ROM, TTP and decreased posture awareness affecting overall functional tolerance.      SUBJECTIVE:     Onset date:   Onset Date: 25 (couple month exacerbation with years of pain reported)    Subjective/ Mechanism of Injury:   Subjective: Pt reports long hx of neck pain and worsening intensity and radiating from neck or upper back and into head causing headaches. Turning head or sidebending B produces increased pain. Denies overall NT however used a massager and noticed numbness in B cervical paraspinals until next day. Reports prescribed meloxicam (continues 1x/day, steroids and muscle relaxers (finished). Denies use of heat or ice at this time.    Precautions/Contraindications/Restrictions: falls risk,            Interventions for current problem: massage, medications , ice, heat      Hand Dominance: Right    Dizziness: yes  Double Vision: no  Changes in Speech: no  Changes in Swallowing: no  Loss of consciousness/Drop Attacks: no  Numbness: B hand feet and groin,

## 2025-04-18 ENCOUNTER — HOSPITAL ENCOUNTER (OUTPATIENT)
Dept: PHYSICAL THERAPY | Age: 55
Setting detail: THERAPIES SERIES
Discharge: HOME | End: 2025-04-18
Payer: COMMERCIAL

## 2025-04-18 PROCEDURE — 97110 THERAPEUTIC EXERCISES: CPT

## 2025-04-18 PROCEDURE — 97140 MANUAL THERAPY 1/> REGIONS: CPT

## 2025-04-18 ASSESSMENT — PAIN DESCRIPTION - ORIENTATION: ORIENTATION: LEFT;RIGHT

## 2025-04-18 ASSESSMENT — PAIN DESCRIPTION - DESCRIPTORS: DESCRIPTORS: ACHING;TIGHTNESS

## 2025-04-18 ASSESSMENT — PAIN DESCRIPTION - LOCATION: LOCATION: NECK

## 2025-04-18 ASSESSMENT — PAIN SCALES - GENERAL: PAINLEVEL_OUTOF10: 5

## 2025-04-18 NOTE — PROGRESS NOTES
[place] [address] [facility] [place] [address]  Phone: [phone]      Date: 2025  Patient: Griffin Herrmann  : 1970   Confirmed: Yes  MRN: [id number]  Referring Provider: Amelia Lea, APRN - CNP    Medical Diagnosis: Cervicalgia [M54.2]  Cervicogenic headache [G44.86]       Treatment Diagnosis: Cervical pain with decreased ROM, TTP and decreased posture awareness affecting overall functional tolerance.    Visit Information:  Insurance: Payor: [employer] BRONSON / Plan: [employer] BRONSON / Product Type: *No Product type* /   PT Visit Information  Onset Date: 25 (couple month exacerbation with years of pain reported)  Total # of Visits Approved: 13 (EVAL + 12 visits >needs authorization (limit 20 visits per calendar year) $20 co-pay)  Total # of Visits to Date: 2  No Show: 0  Canceled Appointment: 0  Progress Note Counter:     Subjective Information:  Subjective: Pt reports neck pain that can cause HA and increase stiffness in the shoulders and cervical spine. States his HA have getting better with medication prescribed by his Dr.  HEP Compliance:  [x] Good [] Fair [] Poor [] Reports not doing due to:    Pain Screening  Patient Currently in Pain: Yes  Pain Assessment: 0-10  Pain Level: 5  Pain Location: Neck (Neck, UT, Upper back)  Pain Orientation: Left, Right  Pain Descriptors: Aching, Tightness    Treatment:  Exercises:  Exercises  Exercise 1: chin tuck 5 x 10 (supine)  Exercise 3: scap retraction 5s x 10 (supine)  Exercise 4: Cervical PROM with towel x 3 x 15\"  Exercise 15: HEP:       Manual:   Manual Therapy  Soft Tissue Mobilizaton: cervical, UT x 10 minutes  Other: PROM of cervical spine (SB) x 4 minutes (with OP)       *Indicates exercise, modality, or manual techniques to be initiated when appropriate    Objective Measures:            Assessment:      Assessment: Initiated additional manual techniques and therex this visit to improve cervical ROM

## 2025-04-21 ENCOUNTER — OFFICE VISIT (OUTPATIENT)
Age: 55
End: 2025-04-21
Payer: COMMERCIAL

## 2025-04-21 VITALS
HEIGHT: 74 IN | DIASTOLIC BLOOD PRESSURE: 78 MMHG | RESPIRATION RATE: 18 BRPM | BODY MASS INDEX: 34.16 KG/M2 | WEIGHT: 266.2 LBS | OXYGEN SATURATION: 96 % | HEART RATE: 70 BPM | SYSTOLIC BLOOD PRESSURE: 138 MMHG | TEMPERATURE: 98.6 F

## 2025-04-21 DIAGNOSIS — J45.40 MODERATE PERSISTENT ASTHMA WITHOUT COMPLICATION: Primary | ICD-10-CM

## 2025-04-21 DIAGNOSIS — E66.09 CLASS 1 OBESITY DUE TO EXCESS CALORIES WITHOUT SERIOUS COMORBIDITY WITH BODY MASS INDEX (BMI) OF 34.0 TO 34.9 IN ADULT: ICD-10-CM

## 2025-04-21 DIAGNOSIS — G47.33 OSA (OBSTRUCTIVE SLEEP APNEA): ICD-10-CM

## 2025-04-21 DIAGNOSIS — E66.811 CLASS 1 OBESITY DUE TO EXCESS CALORIES WITHOUT SERIOUS COMORBIDITY WITH BODY MASS INDEX (BMI) OF 34.0 TO 34.9 IN ADULT: ICD-10-CM

## 2025-04-21 PROCEDURE — 3017F COLORECTAL CA SCREEN DOC REV: CPT | Performed by: INTERNAL MEDICINE

## 2025-04-21 PROCEDURE — 3075F SYST BP GE 130 - 139MM HG: CPT | Performed by: INTERNAL MEDICINE

## 2025-04-21 PROCEDURE — G8427 DOCREV CUR MEDS BY ELIG CLIN: HCPCS | Performed by: INTERNAL MEDICINE

## 2025-04-21 PROCEDURE — 3078F DIAST BP <80 MM HG: CPT | Performed by: INTERNAL MEDICINE

## 2025-04-21 PROCEDURE — 1036F TOBACCO NON-USER: CPT | Performed by: INTERNAL MEDICINE

## 2025-04-21 PROCEDURE — G8417 CALC BMI ABV UP PARAM F/U: HCPCS | Performed by: INTERNAL MEDICINE

## 2025-04-21 PROCEDURE — 99214 OFFICE O/P EST MOD 30 MIN: CPT | Performed by: INTERNAL MEDICINE

## 2025-04-21 RX ORDER — FLUTICASONE FUROATE, UMECLIDINIUM BROMIDE AND VILANTEROL TRIFENATATE 200; 62.5; 25 UG/1; UG/1; UG/1
1 POWDER RESPIRATORY (INHALATION) DAILY
Qty: 1 EACH | Refills: 3 | Status: SHIPPED | OUTPATIENT
Start: 2025-04-21

## 2025-04-21 NOTE — PROGRESS NOTES
Subjective:     Griffin Herrmann is a 55 y.o. male who complains today of:     Chief Complaint   Patient presents with    Follow-up     6 Month F/U for Moderate persistent asthma and KEITH    Shortness of Breath     More increased       HPI  Patient presents for asthma    4/21/2025  Presents for asthma follow-up, reports recent increase in his allergy symptoms with shortness of breath, using rescue inhaler up to 2 times per day, no nighttime symptoms, able to do his daily activities, no chest pain, no wheezing, no lower extremity edema, no heartburn, no nasal congestion or postnasal drip.  Weight is stable        10/21/2024  Presents for follow-up, doing better, he is currently on Breo tolerating well, symptoms controlled, no coughing, no chest pain, no shortness of breath, not using rescue inhaler, no heartburn, no lower extremity edema, no nasal congestion or postnasal drip        7/22/2024  Patient presents for evaluation of asthma, he had recent asthma exacerbation requiring ED visit and a tapered dose prednisone.  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  He has history of KEITH, he was treated with CPAP briefly but he could not tolerate that he uvulopalatopharyngoplasty UPPP, he lost weight also significantly.  Did not repeat sleep study since then.         Allergies:  Pregabalin and Duloxetine  Past Medical History:   Diagnosis Date    Coronary artery disease involving native coronary artery of native heart without angina pectoris 12/13/2022    Hyperlipidemia      Past Surgical History:   Procedure Laterality Date    DIAGNOSTIC CARDIAC CATH LAB PROCEDURE  01/15/2018    PTCA  01/15/2018     Family

## 2025-04-23 ENCOUNTER — HOSPITAL ENCOUNTER (OUTPATIENT)
Dept: PHYSICAL THERAPY | Age: 55
Setting detail: THERAPIES SERIES
Discharge: HOME | End: 2025-04-23
Payer: COMMERCIAL

## 2025-04-23 PROCEDURE — 97140 MANUAL THERAPY 1/> REGIONS: CPT

## 2025-04-23 PROCEDURE — 97110 THERAPEUTIC EXERCISES: CPT

## 2025-04-23 ASSESSMENT — PAIN DESCRIPTION - LOCATION: LOCATION: NECK

## 2025-04-23 ASSESSMENT — PAIN SCALES - GENERAL: PAINLEVEL_OUTOF10: 7

## 2025-04-23 NOTE — PROGRESS NOTES
[facility]  [address] [place] [address]  Phone: [phone]      Date: 2025  Patient: Griffin Herrmann  : 1970   Confirmed: Yes  MRN: [id number]  Referring Provider: Amelia Lea, APRN - CNP    Medical Diagnosis: Cervicalgia [M54.2]  Cervicogenic headache [G44.86]       Treatment Diagnosis: Cervical pain with decreased ROM, TTP and decreased posture awareness affecting overall functional tolerance.    Visit Information:  Insurance: Payor: [employer] BRONSON / Plan: [employer] BRONSON / Product Type: *No Product type* /   PT Visit Information  Onset Date: 25 (couple month exacerbation with years of pain reported)  Total # of Visits Approved: 13 (EVAL + 12 visits >needs authorization (limit 20 visits per calendar year) $20 co-pay)  Total # of Visits to Date: 3  No Show: 0  Canceled Appointment: 0  Progress Note Counter: 3/13    Subjective Information:  Subjective: Pt state he always has neck pain and experiences frequent clicking and popping in his neck with movement. Pt states he has ongoing L shoulder deficits as well which may require PT in the future. 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  HEP Compliance:  [x] Good [] Fair [] Poor [] Reports not doing due to:    Pain Screening  Patient Currently in Pain: Yes  Pain Assessment: 0-10  Pain Level: 7  Pain Location: Neck  Pain Descriptors:  (sharp with head turns; pressure and stiffness at rest)    Treatment:  Exercises:  Exercises  Exercise 1: UBE: retro x 4 min  Exercise 2: cable rows: 40#, 2x15  Exercise 3: t-bar lat pull downs: 40#, 2x15  Exercise 4: doorway 90/90 stretch: 15\" x 3       Manual:   Manual Therapy  Manual Traction: light cervical traction: 10\" pulls x 8  Soft Tissue Mobilizaton: traps, levator scaps, medial scaps, cervical paraspinals  Other: cervical PROM with gentle stretch, SB and rotation bilaterally  Manual x 28'       *Indicates exercise, modality, or manual techniques to be initiated when

## 2025-04-25 ENCOUNTER — HOSPITAL ENCOUNTER (OUTPATIENT)
Dept: PHYSICAL THERAPY | Age: 55
Setting detail: THERAPIES SERIES
Discharge: HOME | End: 2025-04-25
Payer: COMMERCIAL

## 2025-04-25 PROCEDURE — 97110 THERAPEUTIC EXERCISES: CPT

## 2025-04-25 ASSESSMENT — PAIN DESCRIPTION - LOCATION: LOCATION: NECK

## 2025-04-25 ASSESSMENT — PAIN SCALES - GENERAL: PAINLEVEL_OUTOF10: 2

## 2025-04-25 ASSESSMENT — PAIN DESCRIPTION - ORIENTATION: ORIENTATION: LEFT;RIGHT

## 2025-04-25 NOTE — PROGRESS NOTES
[place] [address] [place] [address]  Phone: [phone]      Date: 2025  Patient: Griffin Herrmann  : 1970   Confirmed: Yes  MRN: [id number]  Referring Provider: Amelia Lea, APRN - CNP    Medical Diagnosis: Cervicalgia [M54.2]  Cervicogenic headache [G44.86]       Treatment Diagnosis: Cervical pain with decreased ROM, TTP and decreased posture awareness affecting overall functional tolerance.    Visit Information:  Insurance: Payor: [employer] BR[employer] / Plan: [employer] BRONSON / Product Type: *No Product type* /   PT Visit Information  Onset Date: 25 (couple month exacerbation with years of pain reported)  Total # of Visits Approved: 13 (EVAL + 12 visits >needs authorization (limit 20 visits per calendar year) $20 co-pay)  Total # of Visits to Date: 4  No Show: 0  Canceled Appointment: 0  Progress Note Counter:     Subjective Information:  Subjective: Pt reports his neck movement and pain levels feel a little better than last visit.  HEP Compliance:  [x] Good [] Fair [] Poor [] Reports not doing due to:    Pain Screening  Patient Currently in Pain: Yes  Pain Assessment: 0-10  Pain Level: 2  Best Pain Level: 2  Worst Pain Level: 6 (with head movement)  Pain Location: Neck  Pain Orientation: Left, Right    Treatment:  Exercises:  Exercises  Exercise 1: UBE: R/F/R x 2 min each, L1.0  Exercise 2: doorway 90/90 stretch: 15-20\" holds x 5  Exercise 3: thoracic extensions stretch: 15\" x 3  Exercise 4: lat pulls: RTB x 15  Exercise 5: chest pull / rev fly: RTB x 15  Exercise 6: AAROM neck rotation with towel: x 5 jessica  Exercise 7: cervical retraction isometric: 5\", 2x5  Exercise 8: contract relax cervical rotation       Manual:   Manual Therapy  Soft Tissue Mobilizaton: traps, levator scaps, medial scaps, cervical paraspinals x 5 min  Other: **incorporate traction and joint mobs NV dependent on symptoms  Treatment Reasoning  Limitations addressed: Tissue

## 2025-04-29 ENCOUNTER — TELEPHONE (OUTPATIENT)
Age: 55
End: 2025-04-29

## 2025-04-29 DIAGNOSIS — J45.40 MODERATE PERSISTENT ASTHMA WITHOUT COMPLICATION: ICD-10-CM

## 2025-04-29 NOTE — TELEPHONE ENCOUNTER
PT NEEDS QUANTITY CHANGED FOR PA/          Rx requested:  Requested Prescriptions     Pending Prescriptions Disp Refills    fluticasone-umeclidin-vilant (TRELEGY ELLIPTA) 200-62.5-25 MCG/ACT AEPB inhaler 60 each 3     Sig: Inhale 1 puff into the lungs daily       Last Office Visit:   4/21/2025      Next Visit Date:  Future Appointments   Date Time Provider Department Center   4/30/2025  5:20 PM Jamshid Holloway, PT [facility] OP PT [facility] Center   5/2/2025  9:20 AM Jamshid Holloway, PT [facility] OP PT [facility]   5/5/2025 10:00 AM [place] [facility] INJECTION ROOM 1 [facility] MED [facility] Fac RAD   5/5/2025 11:00 AM [place] NUCLEAR MEDICINE ROOM 1 [facility] NUC MED [facility] Fac RAD   5/5/2025 11:30 AM [facility] STRESS LAB 1 [facility]  [facility] Fac RAD   5/5/2025  1:00 PM [place] NUCLEAR MEDICINE ROOM 1 [facility] NUC MED [facility] Fac RAD   5/7/2025  5:20 PM Jamshid Holloway, PT [facility] OP PT [facility] Center   5/9/2025  9:00 AM [facility] PT COVER 3 [facility] OP PT [facility]   12/9/2025  8:30 AM Jersey Berger, DO [facility] [place]

## 2025-04-29 NOTE — TELEPHONE ENCOUNTER
Pt called in to the office because he thought you were going to send an allergy medication.        Please advise

## 2025-04-29 NOTE — TELEPHONE ENCOUNTER
Spoke to Enma in [facility]. Per Enma stress test diagnosis code had been updated and the stress test has been approved.     Enma will call the patient to make sure that he knows to keep his appointment for his stress test on 05/05/2025.

## 2025-04-29 NOTE — TELEPHONE ENCOUNTER
Rubio Barron- Notice of [place] about your Treatment Request- Stress test denied.       Date of Denial: 4/27/2025     40728- HT Muscle Image Spect Mult    Denial/ blank appeal request forms scanned into chart.

## 2025-04-29 NOTE — TELEPHONE ENCOUNTER
If he does not think Mp is working I will  add Singulair could you please find out for me   [facility] Internal Medicine      SUBJECTIVE:  Jhony Candelaria (:  1999) is a 25 y.o. adult here for evaluation of the following chief complaint(s):  Migraine (Improved pretty well, has not been many since not been at work, less triggers. Has had intermittent over the last couple of days) and Insomnia (Has been going on a long time, melatonin wakes up sweating, can not take benadryl long term.  )  Migraine HAs - Sheilda Mark Anthony use was started 2 visits ago and will need to discuss neurology referral. Less triggers. Potential return to work after ortho visit where there are more triggers for migraine. Will monitor and if migraines return, will refer Marie to neurology for assessment of medication prophylaxis. 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 Sees ortho again on 2022 for 6 week follow-up. Good report on the 2 week visit. Still with some soreness with bending. Expected improvement per ortho for him. Await further orthopedic recommendations    POTS syndrome - on Florinef. Currently without symptoms. Recent labs were normal   Continue Florinef. Anxiety/Depression/Bipolar Disorder - doing well on current medications. Continue current medications    Insomnia - started 2 months ago. Difficulty falling asleep as well as staying asleep. Bed at 11 PM and up at 8 PM.  No TV or electronics. Does leave the room if unable to sleep. Avoids caffeine. Melatonin has not worked. Will need to look at options for this that do not interact with current medication regimen. Had abrasion on inner labia with intercourse and this is happening frequently. Questioning about labioplasty. Dr. Margot Connor at [facility] in [place]. Desires to be seen here. Patient wondering if a labioplasty would be an option. Refer to GYN for further evaluation. Review of Systems - as above.      Current Outpatient Medications on File Prior to Visit Medication Sig Dispense Refill    celecoxib (CELEBREX) 200 MG capsule Takes as needed      Ubrogepant (UBRELVY) 50 MG TABS Take 50 mg by mouth daily as needed (Migraine) May repeat dose after 2 hours if headache is still present. Do not exceed 2 tablets in 24 hours. 30 tablet 0    lidocaine (XYLOCAINE) 5 % ointment Apply topically as needed. 30 g 1    hydrocortisone (ANUSOL-HC) 25 MG suppository Place 1 suppository rectally 2 times daily as needed for Hemorrhoids 12 suppository 1    gabapentin (NEURONTIN) 300 MG capsule Take 1 capsule by mouth 2 times daily for 120 days. 60 capsule 3    busPIRone (BUSPAR) 10 MG tablet take 1 tablet by mouth twice a day (Patient taking differently: 10 mg Indications: Feeling Anxious) 60 tablet 5    lamoTRIgine (LAMICTAL) 100 MG tablet take 1 tablet by mouth once daily (Patient taking differently: Take 100 mg by mouth daily Indications: Mood Disorder take 1 tablet by mouth once daily) 30 tablet 5    DULoxetine (CYMBALTA) 30 MG extended release capsule take 1 capsule by mouth twice a day (Patient taking differently: Indications: Depression take 1 capsule by mouth twice a day) 180 capsule 5    fludrocortisone (FLORINEF) 0.1 MG tablet take 1 tablet by mouth once daily (Patient taking differently: Take 0.1 mg by mouth daily Indications: Postural Orthostatic Tachycardia take 1 tablet by mouth once daily) 90 tablet 3    famotidine (PEPCID) 20 MG tablet Take 20 mg by mouth 2 times daily Taking as needed before ibuprofen      Levonorgestrel [facility]) IUD 19.5 mg 19.5 each by IntraUTERine route Dec 2020 inserted, remove 5 years  Manage endometriosis, not confirmed      Cholecalciferol 50 MCG (2000 UT) TABS Take 2,000 Units by mouth nightly      loratadine (CLARITIN) 10 MG capsule Take 10 mg by mouth daily Indications:  Allergic Rhinitis       ASPIRIN LOW DOSE 81 MG EC tablet TAKE ONE TABLET BY MOUTH TWO TIMES A DAY FOR 3 WEEKS (Patient not taking: Reported on 8/18/2022)      HYDROmorphone (DILAUDID) 2 MG tablet TAKE 1 TABLET BY MOUTH EVERY 4 HOURS AS NEEDED FOR PAIN (Patient not taking: Reported on 8/18/2022)      traMADol (ULTRAM) 50 MG tablet Take 50 mg by mouth every 6 hours as needed. Indications: Pain  (Patient not taking: Reported on 8/18/2022)       No current facility-administered medications on file prior to visit. OBJECTIVE:    VS:   Vitals:    08/18/22 0807   BP: 104/72   Site: Left Upper Arm   Position: Sitting   Cuff Size: Medium Adult   Pulse: (!) 105   Resp: 18   Temp: 97.7 °F (36.5 °C)   TempSrc: Temporal   SpO2: 97%   Weight: 127 lb 8 oz (57.8 kg)   Height: 5' 8\" (1.727 m)     Physical Exam   Lungs:  CTA B  Neck:   No carotid bruits appreciated B.   CVS:  +s1/s2 without m/g/r appreciated. Abd:  + BS, NTND, No renal or aortic bruits   Extr:  2+ DP/PT pulses B, no pitting edema    RTC:  Return in about 4 weeks (around 9/15/2022).     Jameel Wong MD   8/19/2022 12:00 PM

## 2025-04-30 ENCOUNTER — HOSPITAL ENCOUNTER (OUTPATIENT)
Dept: PHYSICAL THERAPY | Age: 55
Setting detail: THERAPIES SERIES
Discharge: HOME | End: 2025-04-30
Payer: COMMERCIAL

## 2025-04-30 DIAGNOSIS — M17.11 PRIMARY OSTEOARTHRITIS OF RIGHT KNEE: Primary | ICD-10-CM

## 2025-04-30 PROCEDURE — 97140 MANUAL THERAPY 1/> REGIONS: CPT

## 2025-04-30 PROCEDURE — 97110 THERAPEUTIC EXERCISES: CPT

## 2025-04-30 RX ORDER — FLUTICASONE FUROATE, UMECLIDINIUM BROMIDE AND VILANTEROL TRIFENATATE 200; 62.5; 25 UG/1; UG/1; UG/1
1 POWDER RESPIRATORY (INHALATION) DAILY
Qty: 1 EACH | Refills: 3 | Status: SHIPPED | OUTPATIENT
Start: 2025-04-30

## 2025-04-30 ASSESSMENT — PAIN DESCRIPTION - LOCATION: LOCATION: NECK

## 2025-04-30 ASSESSMENT — PAIN DESCRIPTION - DESCRIPTORS: DESCRIPTORS: ACHING

## 2025-04-30 ASSESSMENT — PAIN SCALES - GENERAL: PAINLEVEL_OUTOF10: 3

## 2025-04-30 NOTE — PROGRESS NOTES
[facility]  [address] [place] [address]  Phone: [phone]      Date: 2025  Patient: Griffin Herrmann  : 1970   Confirmed: Yes  MRN: [id number]  Referring Provider: Amelia Lea APRN - CNP    Medical Diagnosis: Cervicalgia [M54.2]  Cervicogenic headache [G44.86]       Treatment Diagnosis: Cervical pain with decreased ROM, TTP and decreased posture awareness affecting overall functional tolerance.    Visit Information:  Insurance: Payor: [employer] BR[employer] / Plan: [employer] / Product Type: *No Product type* /   PT Visit Information  Onset Date: 25 (couple month exacerbation with years of pain reported)  Total # of Visits Approved: 13 (EVAL + 12 visits >needs authorization (limit 20 visits per calendar year) $20 co-pay)  Total # of Visits to Date: 5  No Show: 0  Canceled Appointment: 0  Progress Note Counter:     Subjective Information:  Subjective: Pt reports his pain is not too bad today. Pt requests HEP handouts so he can do exercises between clients at work.  HEP Compliance:  [x] Good [] Fair [] Poor [] Reports not doing due to:    Pain Screening  Patient Currently in Pain: Yes  Pain Assessment: 0-10  Pain Level: 3  Pain Location: Neck (upper back)  Pain Descriptors: Aching (occasional sharp pain with head turns)    Treatment:  Exercises:  Exercises  Exercise 1: UBE: L1.0, 60\" intervals, Rx3,Fx2  Exercise 2: t-band high row: RTB x 15  Exercise 3: t-band lat pulls: RTB x 15  Exercise 4: t-band horz abd pulls: RTB x 15    Manual:  Cervical traction: 30-60\" holds  MWM: rotation L/R, flex w/ rotation L/R, ext w/ rotation L/R, SB L/R         *Indicates exercise, modality, or manual techniques to be initiated when appropriate    Objective Measures:     LTG 3 Current Status:: : ext 40 deg, SB R 30 deg, SB L 25 deg, Rot R 60 deg, Rot L 55 deg (pain still reported in all directions)      Assessment:   Body Structures, Functions, Activity

## 2025-05-01 NOTE — TELEPHONE ENCOUNTER
The new script was sent as 1 again. It needs to be sent as 60 each the prior auth is still saying the same thing.

## 2025-05-01 NOTE — TELEPHONE ENCOUNTER
I redid the prior auth on a [facility] caremark form on Cover my meds and it still said PA not required. I called the provider help desk on patient's insurance card for [employer] [facility] and they told me that since patient was taking another inhaler in the same class, that they would have to send me to the [employer] prior auth department. I spoke to an agent there and she is going to send me a prior auth form to fill out for them and for me to put that Dr. Lynch D/C the other inhaler. I also spoke to patient and let him know what is going on.

## 2025-05-02 ENCOUNTER — HOSPITAL ENCOUNTER (OUTPATIENT)
Dept: PHYSICAL THERAPY | Age: 55
Setting detail: THERAPIES SERIES
Discharge: HOME | End: 2025-05-02
Payer: COMMERCIAL

## 2025-05-02 PROCEDURE — 97140 MANUAL THERAPY 1/> REGIONS: CPT

## 2025-05-02 PROCEDURE — 97110 THERAPEUTIC EXERCISES: CPT

## 2025-05-02 NOTE — PROGRESS NOTES
[id number] [facility] [place] [address]  Phone: [phone]      Date: 2025  Patient: Griffin Herrmann  : 1970   Confirmed: Yes  MRN: [id number]  Referring Provider: Amelia Lea, APRN - CNP    Medical Diagnosis: Cervicalgia [M54.2]  Cervicogenic headache [G44.86]       Treatment Diagnosis: Cervical pain with decreased ROM, TTP and decreased posture awareness affecting overall functional tolerance.    Visit Information:  Insurance: Payor: [email or web address] BRONSON / Plan: [email or web address] BRONSON / Product Type: *No Product type* /   PT Visit Information  Onset Date: 25 (couple month exacerbation with years of pain reported)  Total # of Visits Approved: 13 (EVAL + 12 visits >needs authorization (limit 20 visits per calendar year) $20 co-pay)  Total # of Visits to Date: 6  No Show: 0  Canceled Appointment: 0  Progress Note Counter:     Subjective Information:  Subjective: Pt reports no significant pain today. Pt state neck pain only really occurs with head motion. Pt states he will have to leave early today.  HEP Compliance:  [x] Good [] Fair [] Poor [] Reports not doing due to:    Pain Screening  Patient Currently in Pain: Denies    Treatment:  Exercises:  Exercises  Exercise 1: UBE: L1.0, 60\" intervals, Rx3,Fx2  Exercise 2: 90/90 doorway stretch: 20\" x 4  Exercise 3: wall sit chin nod with overhead shoulder flexion: x 20  Exercise 4: wall open book rotations: x 10 jessica       Manual:   Manual Therapy  Other: cervical distraction with assisted rotation, sidebends, flex, and ext x 10 min  Treatment Reasoning  Limitations addressed: Joint motion, Tissue extensibility  Limitations addressed: pain free head ROM    *Indicates exercise, modality, or manual techniques to be initiated when appropriate    Objective Measures:   NT    Assessment:   Body Structures, Functions, Activity Limitations Requiring Skilled Therapeutic Intervention: Decreased posture, Increased pain,

## 2025-05-05 ENCOUNTER — HOSPITAL ENCOUNTER (OUTPATIENT)
Dept: NUCLEAR MEDICINE | Age: 55
Discharge: HOME | End: 2025-05-07
Payer: COMMERCIAL

## 2025-05-05 ENCOUNTER — HOSPITAL ENCOUNTER (OUTPATIENT)
Age: 55
Discharge: HOME | End: 2025-05-07

## 2025-05-05 DIAGNOSIS — R07.9 CHEST PAIN, UNSPECIFIED TYPE: ICD-10-CM

## 2025-05-05 LAB
NUC STRESS EJECTION FRACTION: 62 %
STRESS BASELINE DIAS BP: 95 MMHG
STRESS BASELINE HR: 62 BPM
STRESS BASELINE ST DEPRESSION: 0 MM
STRESS BASELINE SYS BP: 151 MMHG
STRESS ESTIMATED WORKLOAD: 1 METS
STRESS PEAK DIAS BP: 81 MMHG
STRESS PEAK SYS BP: 165 MMHG
STRESS PERCENT HR ACHIEVED: 61 %
STRESS POST PEAK HR: 102 BPM
STRESS RATE PRESSURE PRODUCT: NORMAL BPM*MMHG
STRESS ST DEPRESSION: 0 MM
STRESS TARGET HR: 166 BPM
TID: 1.08

## 2025-05-05 PROCEDURE — 6360000002 HC RX W HCPCS

## 2025-05-05 PROCEDURE — 93018 CV STRESS TEST I&R ONLY: CPT | Performed by: INTERNAL MEDICINE

## 2025-05-05 PROCEDURE — 78452 HT MUSCLE IMAGE SPECT MULT: CPT

## 2025-05-05 PROCEDURE — A9502 TC99M TETROFOSMIN: HCPCS

## 2025-05-05 PROCEDURE — 3430000000 HC RX DIAGNOSTIC RADIOPHARMACEUTICAL

## 2025-05-05 PROCEDURE — 78452 HT MUSCLE IMAGE SPECT MULT: CPT | Performed by: INTERNAL MEDICINE

## 2025-05-05 PROCEDURE — 93017 CV STRESS TEST TRACING ONLY: CPT

## 2025-05-05 PROCEDURE — 2500000003 HC RX 250 WO HCPCS

## 2025-05-05 PROCEDURE — 93016 CV STRESS TEST SUPVJ ONLY: CPT | Performed by: INTERNAL MEDICINE

## 2025-05-05 RX ORDER — REGADENOSON 0.08 MG/ML
0.4 INJECTION, SOLUTION INTRAVENOUS
Status: COMPLETED | OUTPATIENT
Start: 2025-05-05 | End: 2025-05-05

## 2025-05-05 RX ORDER — SODIUM CHLORIDE 0.9 % (FLUSH) 0.9 %
10 SYRINGE (ML) INJECTION PRN
Status: DISCONTINUED | OUTPATIENT
Start: 2025-05-05 | End: 2025-05-08 | Stop reason: HOSPADM

## 2025-05-05 RX ADMIN — TETROFOSMIN 35.8 MILLICURIE: 1.38 INJECTION, POWDER, LYOPHILIZED, FOR SOLUTION INTRAVENOUS at 11:29

## 2025-05-05 RX ADMIN — REGADENOSON 0.4 MG: 0.08 INJECTION, SOLUTION INTRAVENOUS at 11:29

## 2025-05-05 RX ADMIN — TETROFOSMIN 11.6 MILLICURIE: 1.38 INJECTION, POWDER, LYOPHILIZED, FOR SOLUTION INTRAVENOUS at 10:21

## 2025-05-05 RX ADMIN — SODIUM CHLORIDE, PRESERVATIVE FREE 10 ML: 5 INJECTION INTRAVENOUS at 11:30

## 2025-05-05 RX ADMIN — SODIUM CHLORIDE, PRESERVATIVE FREE 10 ML: 5 INJECTION INTRAVENOUS at 10:18

## 2025-05-05 RX ADMIN — SODIUM CHLORIDE, PRESERVATIVE FREE 10 ML: 5 INJECTION INTRAVENOUS at 11:28

## 2025-05-07 ENCOUNTER — HOSPITAL ENCOUNTER (OUTPATIENT)
Dept: PHYSICAL THERAPY | Age: 55
Setting detail: THERAPIES SERIES
Discharge: HOME | End: 2025-05-07
Payer: COMMERCIAL

## 2025-05-07 DIAGNOSIS — J45.40 MODERATE PERSISTENT ASTHMA WITHOUT COMPLICATION: ICD-10-CM

## 2025-05-07 DIAGNOSIS — J45.40 MODERATE PERSISTENT ASTHMA WITHOUT COMPLICATION: Primary | ICD-10-CM

## 2025-05-07 PROCEDURE — 97140 MANUAL THERAPY 1/> REGIONS: CPT

## 2025-05-07 PROCEDURE — 97110 THERAPEUTIC EXERCISES: CPT

## 2025-05-07 RX ORDER — FLUTICASONE PROPIONATE AND SALMETEROL 250; 50 UG/1; UG/1
1 POWDER RESPIRATORY (INHALATION) EVERY 12 HOURS
Qty: 60 EACH | Refills: 3 | Status: CANCELLED | OUTPATIENT
Start: 2025-05-07

## 2025-05-07 RX ORDER — FLUTICASONE FUROATE, UMECLIDINIUM BROMIDE AND VILANTEROL TRIFENATATE 200; 62.5; 25 UG/1; UG/1; UG/1
POWDER RESPIRATORY (INHALATION)
Qty: 60 EACH | Refills: 3 | OUTPATIENT
Start: 2025-05-07

## 2025-05-07 RX ORDER — FLUTICASONE PROPIONATE AND SALMETEROL 500; 50 UG/1; UG/1
1 POWDER RESPIRATORY (INHALATION) EVERY 12 HOURS
Qty: 1 EACH | Refills: 3 | Status: SHIPPED | OUTPATIENT
Start: 2025-05-07

## 2025-05-07 ASSESSMENT — PAIN DESCRIPTION - LOCATION: LOCATION: NECK

## 2025-05-07 ASSESSMENT — PAIN DESCRIPTION - DESCRIPTORS: DESCRIPTORS: ACHING

## 2025-05-07 ASSESSMENT — PAIN SCALES - GENERAL: PAINLEVEL_OUTOF10: 5

## 2025-05-07 NOTE — PROGRESS NOTES
[id number] [facility] [place] [address]  Phone: [phone]      Date: 2025  Patient: Griffin Herrmann  : 1970   Confirmed: Yes  MRN: [id number]  Referring Provider: Amelia Lea, APRN - CNP    Medical Diagnosis: Cervicalgia [M54.2]  Cervicogenic headache [G44.86]       Treatment Diagnosis: Cervical pain with decreased ROM, TTP and decreased posture awareness affecting overall functional tolerance.    Visit Information:  Insurance: Payor: [employer] BR[employer]SON / Plan: [employer] BRONSON / Product Type: *No Product type* /   PT Visit Information  Onset Date: 25 (couple month exacerbation with years of pain reported)  Total # of Visits Approved: 13 (EVAL + 12 visits >needs authorization (limit 20 visits per calendar year) $20 co-pay)  Total # of Visits to Date: 7  No Show: 0  Canceled Appointment: 0  Progress Note Counter:     Subjective Information:  Subjective: Pt reports his neck is still stiff and cracks with movement. Pt reports midline neck pain today in the lower portion of his neck.  HEP Compliance:  [x] Good [] Fair [] Poor [] Reports not doing due to:    Pain Screening  Patient Currently in Pain: Yes  Pain Assessment: 0-10  Pain Level: 5  Pain Location: Neck  Pain Orientation:  (midline)  Pain Descriptors: Aching    Treatment:  Exercises:  Exercises  Exercise 1: UBE: L1.0, 60\" intervals, Rx3,Fx3  Exercise 2: overhead lat pull downs: 3 x 15  Exercise 3: seated mid rows: 3 x 15  Exercise 4: cross body cable chops: 2 x 15 jessica  Exercise 5: wall sit cervical retraction, jessica overhead arm flexion: 2x15  Exercise 6: supine thoracic extensions over bolster under T-spine: 2 x 10       Manual:   Manual Therapy  Other: cervical distraction with assisted rotation, sidebends, flex, and ext x 10 min  Treatment Reasoning  Limitations addressed: Joint motion, Tissue extensibility  Limitations addressed: pain free head ROM    *Indicates exercise, modality, or manual

## 2025-05-07 NOTE — TELEPHONE ENCOUNTER
Trelegy is 600 dollars patient can not afford it.          Wixela is covered.        Rx requested:  Requested Prescriptions     Pending Prescriptions Disp Refills    fluticasone-salmeterol (ADVAIR) 250-50 MCG/ACT AEPB diskus inhaler 60 each 3     Sig: Inhale 1 puff into the lungs in the morning and 1 puff in the evening.       Last Office Visit:   4/21/2025      Next Visit Date:  Future Appointments   Date Time Provider Department Center   5/7/2025  5:20 PM Jamshid Holloway, PT [facility]   5/9/2025  9:00 AM [facility] COVER 4 [facility]   12/9/2025  8:30 AM Celine, DO [facility] [place]

## 2025-05-09 ENCOUNTER — APPOINTMENT (OUTPATIENT)
Dept: PHYSICAL THERAPY | Age: 55
End: 2025-05-09
Payer: COMMERCIAL

## 2025-05-15 ENCOUNTER — TELEPHONE (OUTPATIENT)
Age: 55
End: 2025-05-15

## 2025-05-15 NOTE — TELEPHONE ENCOUNTER
RIGHT KNEE EUFLEXXA INJECTION  AUTH #: [id number] VALID : 05/08/2025-11/8/2025  REFERRAL#[id number]    OK to schedule procedure approved as above.   Please note sides/levels approved and date range.   (If applicable, sides/levels approved may differ from those ordered)    TO BE SCHEDULED WITH BENITA CASTELLANOS

## 2025-05-15 NOTE — TELEPHONE ENCOUNTER
TREE and sent msg to [employer] inform pt to call us back to roldan w/ Stephane for gel inj.     used

## 2025-05-20 ENCOUNTER — OFFICE VISIT (OUTPATIENT)
Dept: ORTHOPEDIC SURGERY | Age: 55
End: 2025-05-20
Payer: COMMERCIAL

## 2025-05-20 VITALS
BODY MASS INDEX: 34.01 KG/M2 | HEIGHT: 74 IN | TEMPERATURE: 97.5 F | OXYGEN SATURATION: 94 % | HEART RATE: 92 BPM | WEIGHT: 265 LBS

## 2025-05-20 DIAGNOSIS — M17.11 PRIMARY OSTEOARTHRITIS OF RIGHT KNEE: Primary | ICD-10-CM

## 2025-05-20 DIAGNOSIS — M25.461 EFFUSION OF RIGHT KNEE: ICD-10-CM

## 2025-05-20 PROCEDURE — 3017F COLORECTAL CA SCREEN DOC REV: CPT | Performed by: PHYSICIAN ASSISTANT

## 2025-05-20 PROCEDURE — G8417 CALC BMI ABV UP PARAM F/U: HCPCS | Performed by: PHYSICIAN ASSISTANT

## 2025-05-20 PROCEDURE — 99214 OFFICE O/P EST MOD 30 MIN: CPT | Performed by: PHYSICIAN ASSISTANT

## 2025-05-20 PROCEDURE — 1036F TOBACCO NON-USER: CPT | Performed by: PHYSICIAN ASSISTANT

## 2025-05-20 PROCEDURE — 20610 DRAIN/INJ JOINT/BURSA W/O US: CPT | Performed by: PHYSICIAN ASSISTANT

## 2025-05-20 PROCEDURE — G8427 DOCREV CUR MEDS BY ELIG CLIN: HCPCS | Performed by: PHYSICIAN ASSISTANT

## 2025-05-20 ASSESSMENT — ENCOUNTER SYMPTOMS: RESPIRATORY NEGATIVE: 1

## 2025-05-20 NOTE — ASSESSMENT & PLAN NOTE
Chronic, not at goal (unstable), Visco lubricant injection today    Orders:    DRAIN/INJECT LARGE JOINT/BURSA    sodium hyaluronate (EUFLEXXA, HYALGAN) injection 20 mg

## 2025-05-20 NOTE — PROGRESS NOTES
Griffin Herrmann (:  1970) is a 55 y.o. male,Established patient, here for evaluation of the following chief complaint(s):  Follow-up (Right knee pain)         Assessment & Plan  Primary osteoarthritis of right knee   Chronic, not at goal (unstable), Visco lubricant injection today    Orders:    DRAIN/INJECT LARGE JOINT/BURSA    sodium hyaluronate (EUFLEXXA, HYALGAN) injection 20 mg    Effusion of right knee   Chronic, not at goal (unstable), aspirate and inject the knee today with Visco lubricant    Orders:    DRAIN/INJECT LARGE JOINT/BURSA    sodium hyaluronate (EUFLEXXA, HYALGAN) injection 20 mg      Assessment & Plan  1. Degenerative arthritis of the right knee.  Approved for Euflexxa injections: 1 injection per week for 3 weeks. Will schedule next week's injection. Today, the knee will be aspirated and injected with viscolubricant.    PROCEDURE  Previous cortisone injection in the right knee provided minimal relief.  Cortisone injection in the left knee previously offered years of relief.  Today, the right knee was aspirated and injected with viscolubricant.        No follow-ups on file.       Subjective   History of Present Illness  The patient is a 55-year-old male presenting for Euflexxa injection in the right knee.    Degenerative Arthritis in Right Knee  He has degenerative arthritis in the right knee. A previous cortisone injection provided minimal relief. No new injuries reported. A cortisone injection in the left knee previously provided years of relief.  - Location: Right knee.  - Character: Degenerative arthritis.  - Alleviating/Aggravating Factors: Previous cortisone injection provided minimal relief.  - Severity: Minimal relief from previous cortisone injection.    MEDICATIONS  - Current: Euflexxa         Review of Systems   Constitutional: Negative.    HENT: Negative.     Respiratory: Negative.     Skin: Negative.    Neurological: Negative.           Results        Objective   Physical

## 2025-05-27 ENCOUNTER — OFFICE VISIT (OUTPATIENT)
Dept: ORTHOPEDIC SURGERY | Age: 55
End: 2025-05-27
Payer: COMMERCIAL

## 2025-05-27 VITALS
BODY MASS INDEX: 34.01 KG/M2 | HEIGHT: 74 IN | WEIGHT: 265 LBS | HEART RATE: 69 BPM | OXYGEN SATURATION: 97 % | DIASTOLIC BLOOD PRESSURE: 82 MMHG | SYSTOLIC BLOOD PRESSURE: 122 MMHG | TEMPERATURE: 98.4 F

## 2025-05-27 DIAGNOSIS — M17.11 PRIMARY OSTEOARTHRITIS OF RIGHT KNEE: Primary | ICD-10-CM

## 2025-05-27 DIAGNOSIS — M25.461 EFFUSION OF RIGHT KNEE: ICD-10-CM

## 2025-05-27 PROCEDURE — 90000 NO LOS: CPT | Performed by: PHYSICIAN ASSISTANT

## 2025-05-27 PROCEDURE — 20610 DRAIN/INJ JOINT/BURSA W/O US: CPT | Performed by: PHYSICIAN ASSISTANT

## 2025-05-27 NOTE — PROGRESS NOTES
Time Out  [x] Patient Verified  [x] Site Verified  [x] Laterality Verified  [x] Procedure Verified    Before aspiration/injection risks/benefits of a Viscolubricant injection including infection, local skin irritation, skin atrophy, continued pain/discomfort, elevated blood sugar, burning, failure to relieve pain, possible late infection were discussed with patient.   Patient verbalized understanding and wanted to proceed with planned procedure.    After prepping and draping the right knee in the usual sterile fashion,  2 cc Euflexxa  were injected intra-articularly after aspirating 0 cc clear yellow joint fluid without any complications.  Patient tolerated this well.    Post-procedure discomfort can be alleviated with additional medications/ice/elevation/rest over the first 24 hours as recommended.     The patient tolerated the procedure well.    If fluid was aspirated that was abnormal it was sent for further studies  in sterile specimen container as ordered to the lab

## 2025-06-03 ENCOUNTER — OFFICE VISIT (OUTPATIENT)
Dept: ORTHOPEDIC SURGERY | Age: 55
End: 2025-06-03
Payer: COMMERCIAL

## 2025-06-03 VITALS
SYSTOLIC BLOOD PRESSURE: 122 MMHG | DIASTOLIC BLOOD PRESSURE: 82 MMHG | OXYGEN SATURATION: 97 % | HEART RATE: 71 BPM | BODY MASS INDEX: 34.01 KG/M2 | WEIGHT: 265 LBS | HEIGHT: 74 IN | TEMPERATURE: 97.8 F

## 2025-06-03 DIAGNOSIS — M17.11 PRIMARY OSTEOARTHRITIS OF RIGHT KNEE: Primary | ICD-10-CM

## 2025-06-03 PROCEDURE — 90000 NO LOS: CPT | Performed by: PHYSICIAN ASSISTANT

## 2025-06-03 PROCEDURE — 20610 DRAIN/INJ JOINT/BURSA W/O US: CPT | Performed by: PHYSICIAN ASSISTANT

## 2025-06-03 NOTE — PROGRESS NOTES
Time Out  [x] Patient Verified  [x] Site Verified  [x] Laterality Verified  [x] Procedure Verified    Before aspiration/injection risks/benefits of a Viscolubricant injection including infection, local skin irritation, skin atrophy, continued pain/discomfort, elevated blood sugar, burning, failure to relieve pain, possible late infection were discussed with patient.   Patient verbalized understanding and wanted to proceed with planned procedure.    After prepping and draping the right knee in the usual sterile fashion,  2 cc of  were injected intra-articularly after aspirating 0 cc clear yellow joint fluid without any complications.  Patient tolerated this well.    Post-procedure discomfort can be alleviated with additional medications/ice/elevation/rest over the first 24 hours as recommended.     The patient tolerated the procedure well.    If fluid was aspirated that was abnormal it was sent for further studies  in sterile specimen container as ordered to the lab     Diagnosis Orders   1. Primary osteoarthritis of right knee  DRAIN/INJECT LARGE JOINT/BURSA    sodium hyaluronate (EUFLEXXA, HYALGAN) injection 20 mg

## 2025-07-15 ENCOUNTER — OFFICE VISIT (OUTPATIENT)
Dept: ORTHOPEDIC SURGERY | Age: 55
End: 2025-07-15
Payer: COMMERCIAL

## 2025-07-15 VITALS
OXYGEN SATURATION: 97 % | SYSTOLIC BLOOD PRESSURE: 132 MMHG | TEMPERATURE: 98.3 F | WEIGHT: 265 LBS | HEIGHT: 74 IN | HEART RATE: 69 BPM | BODY MASS INDEX: 34.01 KG/M2 | DIASTOLIC BLOOD PRESSURE: 80 MMHG

## 2025-07-15 DIAGNOSIS — M25.461 EFFUSION OF RIGHT KNEE: ICD-10-CM

## 2025-07-15 DIAGNOSIS — M17.11 PRIMARY OSTEOARTHRITIS OF RIGHT KNEE: Primary | ICD-10-CM

## 2025-07-15 DIAGNOSIS — S83.206D POSITIVE MCMURRAY TEST OF RIGHT KNEE, SUBSEQUENT ENCOUNTER: ICD-10-CM

## 2025-07-15 PROCEDURE — 3079F DIAST BP 80-89 MM HG: CPT | Performed by: PHYSICIAN ASSISTANT

## 2025-07-15 PROCEDURE — 3017F COLORECTAL CA SCREEN DOC REV: CPT | Performed by: PHYSICIAN ASSISTANT

## 2025-07-15 PROCEDURE — G8417 CALC BMI ABV UP PARAM F/U: HCPCS | Performed by: PHYSICIAN ASSISTANT

## 2025-07-15 PROCEDURE — 99214 OFFICE O/P EST MOD 30 MIN: CPT | Performed by: PHYSICIAN ASSISTANT

## 2025-07-15 PROCEDURE — 3075F SYST BP GE 130 - 139MM HG: CPT | Performed by: PHYSICIAN ASSISTANT

## 2025-07-15 PROCEDURE — 1036F TOBACCO NON-USER: CPT | Performed by: PHYSICIAN ASSISTANT

## 2025-07-15 PROCEDURE — G8427 DOCREV CUR MEDS BY ELIG CLIN: HCPCS | Performed by: PHYSICIAN ASSISTANT

## 2025-07-15 ASSESSMENT — ENCOUNTER SYMPTOMS: RESPIRATORY NEGATIVE: 1

## 2025-07-15 NOTE — PROGRESS NOTES
Griffin Herrmann (:  1970) is a 55 y.o. male,Established patient, here for evaluation of the following chief complaint(s):  Follow-up ( follow up  rt knee/Pain: 2/10 sitting, 5/10 walking/Symptoms: pain, walk slow, feels gel did not take)         Assessment & Plan  Primary osteoarthritis of right knee   Chronic, worsening (exacerbation), MRI scan    Orders:    MRI KNEE RIGHT WO CONTRAST; Future    Effusion of right knee   Chronic, worsening (exacerbation), MRI right knee    Orders:    MRI KNEE RIGHT WO CONTRAST; Future    Positive Nasra test of right knee, subsequent encounter              Assessment & Plan  1. Knee pain:  Likely due to wear and tear, as indicated by x-ray findings. No infection signs. Knee brace not recommended due to discomfort from compression. Order MRI to evaluate joint and cartilage. Prescribe meloxicam once daily with food. Discontinue naproxen, continue aspirin as prescribed. Send meloxicam prescription to pharmacy. Consider arthroscopic surgery if MRI shows significant meniscus damage.    Follow-up: Schedule post-MRI appointment to discuss results and treatment options.        No follow-ups on file.       Subjective   History of Present Illness  The patient is a 55-year-old male presenting with knee pain.    Knee Pain  He describes the pain as a catching or locking sensation. He has not used a knee brace recently and finds it unhelpful. He manages the pain with aspirin as needed. His wife is concerned about infection risk and the concurrent use of naproxen sodium and daily aspirin.  - Character: Catching or locking sensation.  - Alleviating/Aggravating Factors: Knee brace unhelpful; manages pain with aspirin as needed.  - Severity: Concerns about infection risk and concurrent use of naproxen sodium and daily aspirin.    SOCIAL HISTORY  - Marital Status:   - Exercise: Walking         Review of Systems   Constitutional: Negative.    HENT: Negative.     Respiratory:

## 2025-07-28 ENCOUNTER — OFFICE VISIT (OUTPATIENT)
Age: 55
End: 2025-07-28
Payer: COMMERCIAL

## 2025-07-28 VITALS
BODY MASS INDEX: 33.57 KG/M2 | DIASTOLIC BLOOD PRESSURE: 74 MMHG | HEIGHT: 74 IN | HEART RATE: 74 BPM | OXYGEN SATURATION: 95 % | RESPIRATION RATE: 18 BRPM | SYSTOLIC BLOOD PRESSURE: 130 MMHG | TEMPERATURE: 98.5 F | WEIGHT: 261.6 LBS

## 2025-07-28 DIAGNOSIS — E66.811 CLASS 1 OBESITY DUE TO EXCESS CALORIES WITHOUT SERIOUS COMORBIDITY WITH BODY MASS INDEX (BMI) OF 34.0 TO 34.9 IN ADULT: ICD-10-CM

## 2025-07-28 DIAGNOSIS — E66.09 CLASS 1 OBESITY DUE TO EXCESS CALORIES WITHOUT SERIOUS COMORBIDITY WITH BODY MASS INDEX (BMI) OF 34.0 TO 34.9 IN ADULT: ICD-10-CM

## 2025-07-28 DIAGNOSIS — G47.33 OSA (OBSTRUCTIVE SLEEP APNEA): ICD-10-CM

## 2025-07-28 DIAGNOSIS — J45.40 MODERATE PERSISTENT ASTHMA WITHOUT COMPLICATION: Primary | ICD-10-CM

## 2025-07-28 PROCEDURE — 3078F DIAST BP <80 MM HG: CPT | Performed by: INTERNAL MEDICINE

## 2025-07-28 PROCEDURE — 1036F TOBACCO NON-USER: CPT | Performed by: INTERNAL MEDICINE

## 2025-07-28 PROCEDURE — G8417 CALC BMI ABV UP PARAM F/U: HCPCS | Performed by: INTERNAL MEDICINE

## 2025-07-28 PROCEDURE — 99214 OFFICE O/P EST MOD 30 MIN: CPT | Performed by: INTERNAL MEDICINE

## 2025-07-28 PROCEDURE — 3075F SYST BP GE 130 - 139MM HG: CPT | Performed by: INTERNAL MEDICINE

## 2025-07-28 PROCEDURE — G8427 DOCREV CUR MEDS BY ELIG CLIN: HCPCS | Performed by: INTERNAL MEDICINE

## 2025-07-28 PROCEDURE — 3017F COLORECTAL CA SCREEN DOC REV: CPT | Performed by: INTERNAL MEDICINE

## 2025-07-28 RX ORDER — MONTELUKAST SODIUM 10 MG/1
10 TABLET ORAL DAILY
Qty: 30 TABLET | Refills: 3 | Status: SHIPPED | OUTPATIENT
Start: 2025-07-28

## 2025-07-28 NOTE — PROGRESS NOTES
Subjective:     Griffin Herrmann is a 55 y.o. male who complains today of:     Chief Complaint   Patient presents with    Follow-up     3 Month F/U for Moderate persistent asthma        HPI  Patient presents for asthma    7/28/2025  Presents for follow-up  He did not feel Trelegy made significant difference eventually he was changed to Wixela he likes it better, still has occasional shortness of breath, uses rescue inhaler once every 2 weeks, no nighttime symptoms.  No lower extremity edema, no coughing, no nasal congestion or postnasal drip and weight is stable.      4/21/2025  Presents for asthma follow-up, reports recent increase in his allergy symptoms with shortness of breath, using rescue inhaler up to 2 times per day, no nighttime symptoms, able to do his daily activities, no chest pain, no wheezing, no lower extremity edema, no heartburn, no nasal congestion or postnasal drip.  Weight is stable        10/21/2024  Presents for follow-up, doing better, he is currently on Breo tolerating well, symptoms controlled, no coughing, no chest pain, no shortness of breath, not using rescue inhaler, no heartburn, no lower extremity edema, no nasal congestion or postnasal drip        7/22/2024  Patient presents for evaluation of asthma, he had recent asthma exacerbation requiring ED visit and a tapered dose prednisone.  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  He has history of KEITH, he was treated with CPAP briefly but he could not tolerate that he uvulopalatopharyngoplasty UPPP, he lost weight also significantly.  Did not repeat sleep study since then.         Allergies:  Pregabalin and Duloxetine  Past

## 2025-08-09 ENCOUNTER — APPOINTMENT (OUTPATIENT)
Dept: CT IMAGING | Age: 55
End: 2025-08-09
Payer: COMMERCIAL

## 2025-08-09 ENCOUNTER — HOSPITAL ENCOUNTER (EMERGENCY)
Age: 55
Discharge: HOME | End: 2025-08-09
Payer: COMMERCIAL

## 2025-08-09 VITALS
WEIGHT: 254.8 LBS | HEIGHT: 74 IN | OXYGEN SATURATION: 95 % | RESPIRATION RATE: 20 BRPM | SYSTOLIC BLOOD PRESSURE: 150 MMHG | HEART RATE: 79 BPM | DIASTOLIC BLOOD PRESSURE: 84 MMHG | TEMPERATURE: 97.3 F | BODY MASS INDEX: 32.7 KG/M2

## 2025-08-09 DIAGNOSIS — K57.32 DIVERTICULITIS OF COLON: Primary | ICD-10-CM

## 2025-08-09 LAB
ALBUMIN SERPL-MCNC: 4.6 G/DL (ref 3.5–4.6)
ALP SERPL-CCNC: 71 U/L (ref 35–104)
ALT SERPL-CCNC: 22 U/L (ref 0–41)
ANION GAP SERPL CALCULATED.3IONS-SCNC: 12 MEQ/L (ref 9–15)
AST SERPL-CCNC: 16 U/L (ref 0–40)
BASOPHILS # BLD: 0 K/UL (ref 0–0.2)
BASOPHILS NFR BLD: 0.3 %
BILIRUB SERPL-MCNC: 0.6 MG/DL (ref 0.2–0.7)
BILIRUB UR QL STRIP: NEGATIVE
BUN SERPL-MCNC: 15 MG/DL (ref 6–20)
CALCIUM SERPL-MCNC: 9.4 MG/DL (ref 8.5–9.9)
CHLORIDE SERPL-SCNC: 104 MEQ/L (ref 95–107)
CLARITY UR: CLEAR
CO2 SERPL-SCNC: 24 MEQ/L (ref 20–31)
COLOR UR: YELLOW
CREAT SERPL-MCNC: 1.02 MG/DL (ref 0.7–1.2)
EOSINOPHIL # BLD: 0.1 K/UL (ref 0–0.7)
EOSINOPHIL NFR BLD: 0.7 %
ERYTHROCYTE [DISTWIDTH] IN BLOOD BY AUTOMATED COUNT: 13 % (ref 11.5–14.5)
GLOBULIN SER CALC-MCNC: 2.6 G/DL (ref 2.3–3.5)
GLUCOSE SERPL-MCNC: 124 MG/DL (ref 70–99)
GLUCOSE UR STRIP-MCNC: NEGATIVE MG/DL
HCT VFR BLD AUTO: 46.7 % (ref 42–52)
HGB BLD-MCNC: 16.6 G/DL (ref 14–18)
HGB UR QL STRIP: NEGATIVE
KETONES UR STRIP-MCNC: 15 MG/DL
LEUKOCYTE ESTERASE UR QL STRIP: NEGATIVE
LIPASE SERPL-CCNC: 67 U/L (ref 12–95)
LYMPHOCYTES # BLD: 1 K/UL (ref 1–4.8)
LYMPHOCYTES NFR BLD: 12.8 %
MCH RBC QN AUTO: 33.1 PG (ref 27–31.3)
MCHC RBC AUTO-ENTMCNC: 35.5 % (ref 33–37)
MCV RBC AUTO: 93.2 FL (ref 79–92.2)
MONOCYTES # BLD: 0.7 K/UL (ref 0.2–0.8)
MONOCYTES NFR BLD: 9.6 %
NEUTROPHILS # BLD: 5.7 K/UL (ref 1.4–6.5)
NEUTS SEG NFR BLD: 76.3 %
NITRITE UR QL STRIP: NEGATIVE
PH UR STRIP: 5.5 [PH] (ref 5–9)
PLATELET # BLD AUTO: 147 K/UL (ref 130–400)
POC CREATININE WHOLE BLOOD: 1.1
POTASSIUM SERPL-SCNC: 3.8 MEQ/L (ref 3.4–4.9)
PROT SERPL-MCNC: 7.2 G/DL (ref 6.3–8)
PROT UR STRIP-MCNC: NEGATIVE MG/DL
RBC # BLD AUTO: 5.01 M/UL (ref 4.7–6.1)
SODIUM SERPL-SCNC: 140 MEQ/L (ref 135–144)
SP GR UR STRIP: 1.03 (ref 1–1.03)
URINE REFLEX TO CULTURE: ABNORMAL
UROBILINOGEN UR STRIP-ACNC: 0.2 E.U./DL
WBC # BLD AUTO: 7.5 K/UL (ref 4.8–10.8)

## 2025-08-09 PROCEDURE — 6360000002 HC RX W HCPCS: Performed by: PHYSICIAN ASSISTANT

## 2025-08-09 PROCEDURE — 74177 CT ABD & PELVIS W/CONTRAST: CPT

## 2025-08-09 PROCEDURE — 81003 URINALYSIS AUTO W/O SCOPE: CPT

## 2025-08-09 PROCEDURE — 6370000000 HC RX 637 (ALT 250 FOR IP): Performed by: PHYSICIAN ASSISTANT

## 2025-08-09 PROCEDURE — 99285 EMERGENCY DEPT VISIT HI MDM: CPT

## 2025-08-09 PROCEDURE — 80053 COMPREHEN METABOLIC PANEL: CPT

## 2025-08-09 PROCEDURE — 2580000003 HC RX 258: Performed by: PHYSICIAN ASSISTANT

## 2025-08-09 PROCEDURE — 83690 ASSAY OF LIPASE: CPT

## 2025-08-09 PROCEDURE — 85025 COMPLETE CBC W/AUTO DIFF WBC: CPT

## 2025-08-09 PROCEDURE — 6360000004 HC RX CONTRAST MEDICATION: Performed by: PHYSICIAN ASSISTANT

## 2025-08-09 PROCEDURE — 96375 TX/PRO/DX INJ NEW DRUG ADDON: CPT

## 2025-08-09 PROCEDURE — 96374 THER/PROPH/DIAG INJ IV PUSH: CPT

## 2025-08-09 RX ORDER — HYDROCODONE BITARTRATE AND ACETAMINOPHEN 5; 325 MG/1; MG/1
1 TABLET ORAL EVERY 6 HOURS PRN
Qty: 12 TABLET | Refills: 0 | Status: SHIPPED | OUTPATIENT
Start: 2025-08-09 | End: 2025-08-12

## 2025-08-09 RX ORDER — MORPHINE SULFATE 4 MG/ML
4 INJECTION, SOLUTION INTRAMUSCULAR; INTRAVENOUS ONCE
Refills: 0 | Status: COMPLETED | OUTPATIENT
Start: 2025-08-09 | End: 2025-08-09

## 2025-08-09 RX ORDER — ONDANSETRON 2 MG/ML
4 INJECTION INTRAMUSCULAR; INTRAVENOUS ONCE
Status: COMPLETED | OUTPATIENT
Start: 2025-08-09 | End: 2025-08-09

## 2025-08-09 RX ORDER — IOPAMIDOL 755 MG/ML
75 INJECTION, SOLUTION INTRAVASCULAR
Status: COMPLETED | OUTPATIENT
Start: 2025-08-09 | End: 2025-08-09

## 2025-08-09 RX ORDER — 0.9 % SODIUM CHLORIDE 0.9 %
1000 INTRAVENOUS SOLUTION INTRAVENOUS ONCE
Status: COMPLETED | OUTPATIENT
Start: 2025-08-09 | End: 2025-08-09

## 2025-08-09 RX ORDER — ONDANSETRON 4 MG/1
4 TABLET, ORALLY DISINTEGRATING ORAL 3 TIMES DAILY PRN
Qty: 21 TABLET | Refills: 0 | Status: SHIPPED | OUTPATIENT
Start: 2025-08-09

## 2025-08-09 RX ADMIN — MORPHINE SULFATE 4 MG: 4 INJECTION, SOLUTION INTRAMUSCULAR; INTRAVENOUS at 09:21

## 2025-08-09 RX ADMIN — ONDANSETRON 4 MG: 2 INJECTION, SOLUTION INTRAMUSCULAR; INTRAVENOUS at 09:21

## 2025-08-09 RX ADMIN — AMOXICILLIN AND CLAVULANATE POTASSIUM 1 TABLET: 875; 125 TABLET, FILM COATED ORAL at 11:15

## 2025-08-09 RX ADMIN — SODIUM CHLORIDE 1000 ML: 0.9 INJECTION, SOLUTION INTRAVENOUS at 09:23

## 2025-08-09 RX ADMIN — IOPAMIDOL 75 ML: 755 INJECTION, SOLUTION INTRAVENOUS at 10:02

## 2025-08-09 ASSESSMENT — ENCOUNTER SYMPTOMS
BACK PAIN: 0
NAUSEA: 1
DIARRHEA: 0
RHINORRHEA: 0
SHORTNESS OF BREATH: 0
PHOTOPHOBIA: 0
COUGH: 0
SORE THROAT: 0
ABDOMINAL PAIN: 1
EYE PAIN: 0
VOMITING: 0

## 2025-08-09 ASSESSMENT — PAIN SCALES - GENERAL
PAINLEVEL_OUTOF10: 6
PAINLEVEL_OUTOF10: 3
PAINLEVEL_OUTOF10: 6

## 2025-08-09 ASSESSMENT — PAIN - FUNCTIONAL ASSESSMENT
PAIN_FUNCTIONAL_ASSESSMENT: 0-10
PAIN_FUNCTIONAL_ASSESSMENT: 0-10

## 2025-08-09 ASSESSMENT — PAIN DESCRIPTION - ORIENTATION: ORIENTATION: MID;LOWER

## 2025-08-09 ASSESSMENT — PAIN DESCRIPTION - LOCATION
LOCATION: ABDOMEN

## 2025-08-10 LAB
PERFORMED ON: NORMAL
POC CREATININE: 1.1 MG/DL (ref 0.8–1.3)
POC SAMPLE TYPE: NORMAL